# Patient Record
Sex: FEMALE | Race: WHITE | NOT HISPANIC OR LATINO | Employment: STUDENT | URBAN - METROPOLITAN AREA
[De-identification: names, ages, dates, MRNs, and addresses within clinical notes are randomized per-mention and may not be internally consistent; named-entity substitution may affect disease eponyms.]

---

## 2022-02-08 ENCOUNTER — LAB VISIT (OUTPATIENT)
Dept: LAB | Facility: HOSPITAL | Age: 22
End: 2022-02-08
Attending: STUDENT IN AN ORGANIZED HEALTH CARE EDUCATION/TRAINING PROGRAM
Payer: MEDICAID

## 2022-02-08 ENCOUNTER — OFFICE VISIT (OUTPATIENT)
Dept: OBSTETRICS AND GYNECOLOGY | Facility: CLINIC | Age: 22
End: 2022-02-08
Payer: MEDICAID

## 2022-02-08 VITALS
HEIGHT: 62 IN | SYSTOLIC BLOOD PRESSURE: 102 MMHG | BODY MASS INDEX: 21.17 KG/M2 | DIASTOLIC BLOOD PRESSURE: 64 MMHG | WEIGHT: 115.06 LBS | RESPIRATION RATE: 16 BRPM

## 2022-02-08 DIAGNOSIS — N91.0 DELAYED MENSES: ICD-10-CM

## 2022-02-08 DIAGNOSIS — Z01.419 ENCOUNTER FOR ROUTINE GYNECOLOGICAL EXAMINATION WITH PAPANICOLAOU SMEAR OF CERVIX: ICD-10-CM

## 2022-02-08 DIAGNOSIS — N91.0 DELAYED MENSES: Primary | ICD-10-CM

## 2022-02-08 LAB
ABO + RH BLD: NORMAL
B-HCG UR QL: POSITIVE
BASOPHILS # BLD AUTO: 0.02 K/UL (ref 0–0.2)
BASOPHILS NFR BLD: 0.3 % (ref 0–1.9)
BLD GP AB SCN CELLS X3 SERPL QL: NORMAL
CTP QC/QA: YES
DIFFERENTIAL METHOD: ABNORMAL
EOSINOPHIL # BLD AUTO: 0 K/UL (ref 0–0.5)
EOSINOPHIL NFR BLD: 0.3 % (ref 0–8)
ERYTHROCYTE [DISTWIDTH] IN BLOOD BY AUTOMATED COUNT: 12.3 % (ref 11.5–14.5)
HCT VFR BLD AUTO: 35.6 % (ref 37–48.5)
HGB BLD-MCNC: 12.3 G/DL (ref 12–16)
IMM GRANULOCYTES # BLD AUTO: 0.01 K/UL (ref 0–0.04)
IMM GRANULOCYTES NFR BLD AUTO: 0.2 % (ref 0–0.5)
LYMPHOCYTES # BLD AUTO: 1.5 K/UL (ref 1–4.8)
LYMPHOCYTES NFR BLD: 22.4 % (ref 18–48)
MCH RBC QN AUTO: 31.3 PG (ref 27–31)
MCHC RBC AUTO-ENTMCNC: 34.6 G/DL (ref 32–36)
MCV RBC AUTO: 91 FL (ref 82–98)
MONOCYTES # BLD AUTO: 0.5 K/UL (ref 0.3–1)
MONOCYTES NFR BLD: 7.4 % (ref 4–15)
NEUTROPHILS # BLD AUTO: 4.5 K/UL (ref 1.8–7.7)
NEUTROPHILS NFR BLD: 69.4 % (ref 38–73)
NRBC BLD-RTO: 0 /100 WBC
PLATELET # BLD AUTO: 269 K/UL (ref 150–450)
PMV BLD AUTO: 10.9 FL (ref 9.2–12.9)
RBC # BLD AUTO: 3.93 M/UL (ref 4–5.4)
WBC # BLD AUTO: 6.47 K/UL (ref 3.9–12.7)

## 2022-02-08 PROCEDURE — 86787 VARICELLA-ZOSTER ANTIBODY: CPT | Performed by: STUDENT IN AN ORGANIZED HEALTH CARE EDUCATION/TRAINING PROGRAM

## 2022-02-08 PROCEDURE — 76817 PR US, OB, TRANSVAG APPROACH: ICD-10-PCS | Mod: 26,S$PBB,, | Performed by: STUDENT IN AN ORGANIZED HEALTH CARE EDUCATION/TRAINING PROGRAM

## 2022-02-08 PROCEDURE — 99204 OFFICE O/P NEW MOD 45 MIN: CPT | Mod: S$PBB,TH,25, | Performed by: STUDENT IN AN ORGANIZED HEALTH CARE EDUCATION/TRAINING PROGRAM

## 2022-02-08 PROCEDURE — 87340 HEPATITIS B SURFACE AG IA: CPT | Performed by: STUDENT IN AN ORGANIZED HEALTH CARE EDUCATION/TRAINING PROGRAM

## 2022-02-08 PROCEDURE — 87624 HPV HI-RISK TYP POOLED RSLT: CPT | Performed by: STUDENT IN AN ORGANIZED HEALTH CARE EDUCATION/TRAINING PROGRAM

## 2022-02-08 PROCEDURE — 1159F PR MEDICATION LIST DOCUMENTED IN MEDICAL RECORD: ICD-10-PCS | Mod: CPTII,,, | Performed by: STUDENT IN AN ORGANIZED HEALTH CARE EDUCATION/TRAINING PROGRAM

## 2022-02-08 PROCEDURE — 81220 CFTR GENE COM VARIANTS: CPT | Performed by: STUDENT IN AN ORGANIZED HEALTH CARE EDUCATION/TRAINING PROGRAM

## 2022-02-08 PROCEDURE — 87591 N.GONORRHOEAE DNA AMP PROB: CPT | Mod: 59 | Performed by: STUDENT IN AN ORGANIZED HEALTH CARE EDUCATION/TRAINING PROGRAM

## 2022-02-08 PROCEDURE — 99999 PR PBB SHADOW E&M-EST. PATIENT-LVL III: CPT | Mod: PBBFAC,,, | Performed by: STUDENT IN AN ORGANIZED HEALTH CARE EDUCATION/TRAINING PROGRAM

## 2022-02-08 PROCEDURE — 76817 TRANSVAGINAL US OBSTETRIC: CPT | Mod: PBBFAC,PN | Performed by: STUDENT IN AN ORGANIZED HEALTH CARE EDUCATION/TRAINING PROGRAM

## 2022-02-08 PROCEDURE — 87086 URINE CULTURE/COLONY COUNT: CPT | Performed by: STUDENT IN AN ORGANIZED HEALTH CARE EDUCATION/TRAINING PROGRAM

## 2022-02-08 PROCEDURE — 3078F DIAST BP <80 MM HG: CPT | Mod: CPTII,,, | Performed by: STUDENT IN AN ORGANIZED HEALTH CARE EDUCATION/TRAINING PROGRAM

## 2022-02-08 PROCEDURE — 87389 HIV-1 AG W/HIV-1&-2 AB AG IA: CPT | Performed by: STUDENT IN AN ORGANIZED HEALTH CARE EDUCATION/TRAINING PROGRAM

## 2022-02-08 PROCEDURE — 36415 COLL VENOUS BLD VENIPUNCTURE: CPT | Mod: PN | Performed by: STUDENT IN AN ORGANIZED HEALTH CARE EDUCATION/TRAINING PROGRAM

## 2022-02-08 PROCEDURE — 86803 HEPATITIS C AB TEST: CPT | Performed by: STUDENT IN AN ORGANIZED HEALTH CARE EDUCATION/TRAINING PROGRAM

## 2022-02-08 PROCEDURE — 85025 COMPLETE CBC W/AUTO DIFF WBC: CPT | Performed by: STUDENT IN AN ORGANIZED HEALTH CARE EDUCATION/TRAINING PROGRAM

## 2022-02-08 PROCEDURE — 86762 RUBELLA ANTIBODY: CPT | Performed by: STUDENT IN AN ORGANIZED HEALTH CARE EDUCATION/TRAINING PROGRAM

## 2022-02-08 PROCEDURE — 99204 PR OFFICE/OUTPT VISIT, NEW, LEVL IV, 45-59 MIN: ICD-10-PCS | Mod: S$PBB,TH,25, | Performed by: STUDENT IN AN ORGANIZED HEALTH CARE EDUCATION/TRAINING PROGRAM

## 2022-02-08 PROCEDURE — 99213 OFFICE O/P EST LOW 20 MIN: CPT | Mod: PBBFAC,PN | Performed by: STUDENT IN AN ORGANIZED HEALTH CARE EDUCATION/TRAINING PROGRAM

## 2022-02-08 PROCEDURE — 3008F BODY MASS INDEX DOCD: CPT | Mod: CPTII,,, | Performed by: STUDENT IN AN ORGANIZED HEALTH CARE EDUCATION/TRAINING PROGRAM

## 2022-02-08 PROCEDURE — 76817 TRANSVAGINAL US OBSTETRIC: CPT | Mod: 26,S$PBB,, | Performed by: STUDENT IN AN ORGANIZED HEALTH CARE EDUCATION/TRAINING PROGRAM

## 2022-02-08 PROCEDURE — 86592 SYPHILIS TEST NON-TREP QUAL: CPT | Performed by: STUDENT IN AN ORGANIZED HEALTH CARE EDUCATION/TRAINING PROGRAM

## 2022-02-08 PROCEDURE — 81025 URINE PREGNANCY TEST: CPT | Mod: PBBFAC,PN | Performed by: STUDENT IN AN ORGANIZED HEALTH CARE EDUCATION/TRAINING PROGRAM

## 2022-02-08 PROCEDURE — 3008F PR BODY MASS INDEX (BMI) DOCUMENTED: ICD-10-PCS | Mod: CPTII,,, | Performed by: STUDENT IN AN ORGANIZED HEALTH CARE EDUCATION/TRAINING PROGRAM

## 2022-02-08 PROCEDURE — 3078F PR MOST RECENT DIASTOLIC BLOOD PRESSURE < 80 MM HG: ICD-10-PCS | Mod: CPTII,,, | Performed by: STUDENT IN AN ORGANIZED HEALTH CARE EDUCATION/TRAINING PROGRAM

## 2022-02-08 PROCEDURE — 87491 CHLMYD TRACH DNA AMP PROBE: CPT | Mod: 59 | Performed by: STUDENT IN AN ORGANIZED HEALTH CARE EDUCATION/TRAINING PROGRAM

## 2022-02-08 PROCEDURE — 99999 PR PBB SHADOW E&M-EST. PATIENT-LVL III: ICD-10-PCS | Mod: PBBFAC,,, | Performed by: STUDENT IN AN ORGANIZED HEALTH CARE EDUCATION/TRAINING PROGRAM

## 2022-02-08 PROCEDURE — 88175 CYTOPATH C/V AUTO FLUID REDO: CPT | Performed by: STUDENT IN AN ORGANIZED HEALTH CARE EDUCATION/TRAINING PROGRAM

## 2022-02-08 PROCEDURE — 3074F SYST BP LT 130 MM HG: CPT | Mod: CPTII,,, | Performed by: STUDENT IN AN ORGANIZED HEALTH CARE EDUCATION/TRAINING PROGRAM

## 2022-02-08 PROCEDURE — 3074F PR MOST RECENT SYSTOLIC BLOOD PRESSURE < 130 MM HG: ICD-10-PCS | Mod: CPTII,,, | Performed by: STUDENT IN AN ORGANIZED HEALTH CARE EDUCATION/TRAINING PROGRAM

## 2022-02-08 PROCEDURE — 86901 BLOOD TYPING SEROLOGIC RH(D): CPT | Performed by: STUDENT IN AN ORGANIZED HEALTH CARE EDUCATION/TRAINING PROGRAM

## 2022-02-08 PROCEDURE — 1159F MED LIST DOCD IN RCRD: CPT | Mod: CPTII,,, | Performed by: STUDENT IN AN ORGANIZED HEALTH CARE EDUCATION/TRAINING PROGRAM

## 2022-02-08 RX ORDER — ONDANSETRON 4 MG/1
4 TABLET, ORALLY DISINTEGRATING ORAL EVERY 6 HOURS PRN
Qty: 30 TABLET | Refills: 0 | Status: SHIPPED | OUTPATIENT
Start: 2022-02-08 | End: 2022-02-26 | Stop reason: SDUPTHER

## 2022-02-08 RX ORDER — DOXYLAMINE SUCCINATE AND PYRIDOXINE HYDROCHLORIDE, DELAYED RELEASE TABLETS 10 MG/10 MG 10; 10 MG/1; MG/1
2 TABLET, DELAYED RELEASE ORAL NIGHTLY
Qty: 60 TABLET | Refills: 1 | Status: ON HOLD | OUTPATIENT
Start: 2022-02-08 | End: 2022-09-16 | Stop reason: HOSPADM

## 2022-02-08 NOTE — PROGRESS NOTES
"CC: Absence of menses    Nany Travis is a 21 y.o. female No obstetric history on file. presents with complaint of absence of menstruation.  She denies nausea/vomIting/abdominal pain/bleeding.  UPT is positive.     LMP 12/6/21 9w1d 9/12/22    G1, surprise pregnancy     No immediate FH of genetic disorders or chromosomal abnormalities, no babies with CHD    Covid vaccinated      and goes to - [employer] in May, [employer]. Partner [employer]      History reviewed. No pertinent past medical history.  History reviewed. No pertinent surgical history.  Social History     Socioeconomic History    Marital status: Single   Tobacco Use    Smoking status: Never Smoker    Smokeless tobacco: Never Used   Substance and Sexual Activity    Alcohol use: Not Currently    Drug use: Never    Sexual activity: Yes     Partners: Male     Family History   Problem Relation Age of Onset    Breast cancer Neg Hx     Colon cancer Neg Hx     Diabetes Neg Hx     Ovarian cancer Neg Hx      OB History   No obstetric history on file.       /64   Resp 16   Ht 5' 2" (1.575 m)   Wt 52.2 kg (115 lb 1.3 oz)   LMP 12/06/2021 (Approximate)   BMI 21.05 kg/m²     ROS:  GENERAL: Denies weight gain or weight loss. Feeling well overall.   SKIN: Denies rash or lesions.   HEAD: Denies head injury or headache.   NODES: Denies enlarged lymph nodes.   CHEST: Denies chest pain or shortness of breath.   CARDIOVASCULAR: Denies palpitations or left sided chest pain.   ABDOMEN: No abdominal pain, constipation, diarrhea, nausea, vomiting or rectal bleeding.   URINARY: No frequency, dysuria, hematuria, or burning on urination.  REPRODUCTIVE: See HPI.   BREASTS: The patient performs breast self-examination and denies pain, lumps, or nipple discharge.   HEMATOLOGIC: No easy bruisability or excessive bleeding.   MUSCULOSKELETAL: Denies joint pain or swelling.   NEUROLOGIC: Denies syncope or weakness.   PSYCHIATRIC: Denies depression, " anxiety or mood swings.    PE:   APPEARANCE: Well nourished, well developed, in no acute distress.  AFFECT: WNL, alert and oriented x 3.  SKIN: No acne or hirsutism.  NECK: Neck symmetric without masses or thyromegaly.  NODES: No inguinal, cervical, axillary or femoral lymph node enlargement.  CHEST: Good respiratory effort.   ABDOMEN: Soft. No tenderness or masses. No hepatosplenomegaly. No hernias.  BREASTS: Symmetrical, no skin changes or visible lesions. No palpable masses, nipple discharge bilaterally.  PELVIC: Normal external female genitalia without lesions. Normal hair distribution. Adequate perineal body, normal urethral meatus. Vagina moist and well rugated without lesions or discharge. Cervix pink, without lesions, discharge or tenderness. No significant cystocele or rectocele. Bimanual exam shows uterus is 9 weeks, regular, mobile and nontender. Adnexa without masses or tenderness.  EXTREMITIES: No edema.      ULTRASOUND:   Ultrasound performed in the usual fashion, showing viable ibarra intrauterine pregnancy, crown-rump length 22mm  with flicker,   consistent with    9w0d     and EDC 9/12/22  No free fluid in cul-de-sac or adnexal pathology.    ASSESSMENT and PLAN:    ICD-10-CM ICD-9-CM    1. Delayed menses  N91.0 626.8 POCT urine pregnancy       Orders Placed This Encounter   Procedures    POCT urine pregnancy       FRANK 9/12/22      - Patient was counseled today on proper weight gain based on the [facility]'s recommendations based on her pre-pregnancy weight. Discussed foods to avoid in pregnancy (i.e. sushi, fish that are high in mercury, deli meat, and unpasteurized cheeses). Discussed prenatal vitamin options (i.e. stool softener, DHA). Discussed genetic screening, patient desires, discussed genetic carrier screening, patient desires   - 1T labs ordered  - NIPT ordered   - pap today   - US performed today in clinic       No follow-ups on file.

## 2022-02-09 LAB
BACTERIA UR CULT: NORMAL
HBV SURFACE AG SERPL QL IA: NEGATIVE
HCV AB SERPL QL IA: NEGATIVE
HIV 1+2 AB+HIV1 P24 AG SERPL QL IA: NEGATIVE
RPR SER QL: NORMAL
RUBV IGG SER-ACNC: 13.6 IU/ML
RUBV IGG SER-IMP: REACTIVE
VARICELLA INTERPRETATION: NEGATIVE
VARICELLA ZOSTER IGG: 0.55 ISR (ref 0–0.9)

## 2022-02-10 ENCOUNTER — PATIENT MESSAGE (OUTPATIENT)
Dept: OBSTETRICS AND GYNECOLOGY | Facility: CLINIC | Age: 22
End: 2022-02-10
Payer: MEDICAID

## 2022-02-10 LAB
C TRACH DNA SPEC QL NAA+PROBE: NOT DETECTED
N GONORRHOEA DNA SPEC QL NAA+PROBE: NOT DETECTED

## 2022-02-16 LAB
CLINICAL INFO: ABNORMAL
CYTO CVX: ABNORMAL
CYTOLOGIST CVX/VAG CYTO: ABNORMAL
CYTOLOGIST CVX/VAG CYTO: ABNORMAL
CYTOLOGY CMNT CVX/VAG CYTO-IMP: ABNORMAL
CYTOLOGY PAP THIN PREP EXPLANATION: ABNORMAL
DATE OF PREVIOUS PAP: ABNORMAL
DATE PREVIOUS BX: NO
GEN CATEG CVX/VAG CYTO-IMP: ABNORMAL
HPV I/H RISK 4 DNA CVX QL NAA+PROBE: NOT DETECTED
LMP START DATE: ABNORMAL
MICROORGANISM CVX/VAG CYTO: ABNORMAL
PATHOLOGIST CVX/VAG CYTO: ABNORMAL
SERVICE CMNT-IMP: ABNORMAL
SPECIMEN SOURCE CVX/VAG CYTO: ABNORMAL
STAT OF ADQ CVX/VAG CYTO-IMP: ABNORMAL

## 2022-02-22 LAB
CFTR MUT ANL BLD/T: NEGATIVE
CFTR MUT ANL BLD/T: NORMAL
CFTR MUT TESTED BLD/T: NORMAL
GENETICIST REVIEW: NORMAL
REF LAB TEST METHOD: NORMAL

## 2022-03-03 ENCOUNTER — PATIENT MESSAGE (OUTPATIENT)
Dept: OBSTETRICS AND GYNECOLOGY | Facility: CLINIC | Age: 22
End: 2022-03-03
Payer: MEDICAID

## 2022-03-03 RX ORDER — ONDANSETRON 8 MG/1
8 TABLET, ORALLY DISINTEGRATING ORAL EVERY 12 HOURS PRN
Qty: 20 TABLET | Refills: 1 | Status: SHIPPED | OUTPATIENT
Start: 2022-03-03 | End: 2022-04-28 | Stop reason: SDUPTHER

## 2022-03-08 ENCOUNTER — ROUTINE PRENATAL (OUTPATIENT)
Dept: OBSTETRICS AND GYNECOLOGY | Facility: CLINIC | Age: 22
End: 2022-03-08
Payer: MEDICAID

## 2022-03-08 VITALS
WEIGHT: 116.19 LBS | SYSTOLIC BLOOD PRESSURE: 110 MMHG | BODY MASS INDEX: 21.25 KG/M2 | DIASTOLIC BLOOD PRESSURE: 72 MMHG

## 2022-03-08 DIAGNOSIS — Z3A.13 13 WEEKS GESTATION OF PREGNANCY: Primary | ICD-10-CM

## 2022-03-08 LAB
BILIRUB SERPL-MCNC: NORMAL MG/DL
BLOOD URINE, POC: NORMAL
CLARITY, POC UA: CLEAR
COLOR, POC UA: YELLOW
GLUCOSE UR QL STRIP: NORMAL
KETONES UR QL STRIP: NORMAL
LEUKOCYTE ESTERASE URINE, POC: NORMAL
NITRITE, POC UA: NORMAL
PH, POC UA: 8
PROTEIN, POC: NORMAL
SPECIFIC GRAVITY, POC UA: NORMAL
UROBILINOGEN, POC UA: NORMAL

## 2022-03-08 PROCEDURE — 99213 PR OFFICE/OUTPT VISIT, EST, LEVL III, 20-29 MIN: ICD-10-PCS | Mod: S$PBB,TH,, | Performed by: STUDENT IN AN ORGANIZED HEALTH CARE EDUCATION/TRAINING PROGRAM

## 2022-03-08 PROCEDURE — 81002 URINALYSIS NONAUTO W/O SCOPE: CPT | Mod: PBBFAC,PN | Performed by: STUDENT IN AN ORGANIZED HEALTH CARE EDUCATION/TRAINING PROGRAM

## 2022-03-08 PROCEDURE — 99999 PR PBB SHADOW E&M-EST. PATIENT-LVL III: CPT | Mod: PBBFAC,,, | Performed by: STUDENT IN AN ORGANIZED HEALTH CARE EDUCATION/TRAINING PROGRAM

## 2022-03-08 PROCEDURE — 99999 PR PBB SHADOW E&M-EST. PATIENT-LVL III: ICD-10-PCS | Mod: PBBFAC,,, | Performed by: STUDENT IN AN ORGANIZED HEALTH CARE EDUCATION/TRAINING PROGRAM

## 2022-03-08 PROCEDURE — 99213 OFFICE O/P EST LOW 20 MIN: CPT | Mod: S$PBB,TH,, | Performed by: STUDENT IN AN ORGANIZED HEALTH CARE EDUCATION/TRAINING PROGRAM

## 2022-03-08 PROCEDURE — 99213 OFFICE O/P EST LOW 20 MIN: CPT | Mod: PBBFAC,PN | Performed by: STUDENT IN AN ORGANIZED HEALTH CARE EDUCATION/TRAINING PROGRAM

## 2022-03-08 NOTE — PROGRESS NOTES
Complaints today:Still some nausea and food aversions. Zofran working, constipated. No Bleeding or pains    /72   Wt 52.7 kg (116 lb 2.9 oz)   LMP 2021 (Approximate)   BMI 21.25 kg/m²     21 y.o., at 13w1d by Estimated Date of Delivery: 22  There is no problem list on file for this patient.    OB History    Para Term  AB Living   1             SAB IAB Ectopic Multiple Live Births                  # Outcome Date GA Lbr Morris/2nd Weight Sex Delivery Anes PTL Lv   1 Current                Dating reviewed    Allergies and problem list reviewed and updated    Medical and surgical history reviewed    Prenatal labs reviewed and updated    Physical Exam:  ABD: soft, gravid, nontender,       Assessment:  Nany was seen today for routine prenatal visit.    Diagnoses and all orders for this visit:    13 weeks gestation of pregnancy  -     POCT URINE DIPSTICK WITHOUT MICROSCOPE          Plan:   - reviewed 1 T labs  - NIPT neg, F  - order anatomy next visit    follow up 4Weeks, bleeding/pain precautions, labor precautions given    Rosie Hinojosa M.D.  Obstetrics and Gynecology

## 2022-03-25 ENCOUNTER — TELEPHONE (OUTPATIENT)
Dept: OBSTETRICS AND GYNECOLOGY | Facility: CLINIC | Age: 22
End: 2022-03-25
Payer: MEDICAID

## 2022-03-25 NOTE — TELEPHONE ENCOUNTER
----- Message from Natalie Cunningham sent at 3/25/2022 11:53 AM CDT -----  Needs Medical Advice    Who Called: PT     Symptoms (please be specific):  heart rate high chest pain    How long has patient had these symptoms: yesterday     Pharmacy name and phone #:      Best Call Back Number:[phone]

## 2022-03-25 NOTE — TELEPHONE ENCOUNTER
Pt stated that she has been having to take deep breaths at times and felt shaky. Stated that she has had an increased heart rate when she was at her chiropractor. Advised pt to do some relaxation techniques to see if it would help and control her breathing, if the symptoms did not go away or if chest pains started to go to the ER for evaluation. Pt verbalized understanding.

## 2022-04-05 ENCOUNTER — ROUTINE PRENATAL (OUTPATIENT)
Dept: OBSTETRICS AND GYNECOLOGY | Facility: CLINIC | Age: 22
End: 2022-04-05
Payer: MEDICAID

## 2022-04-05 VITALS
BODY MASS INDEX: 21.94 KG/M2 | SYSTOLIC BLOOD PRESSURE: 108 MMHG | DIASTOLIC BLOOD PRESSURE: 60 MMHG | WEIGHT: 119.94 LBS

## 2022-04-05 DIAGNOSIS — Z3A.17 17 WEEKS GESTATION OF PREGNANCY: Primary | ICD-10-CM

## 2022-04-05 LAB
BILIRUB SERPL-MCNC: NORMAL MG/DL
BLOOD URINE, POC: NORMAL
CLARITY, POC UA: CLEAR
COLOR, POC UA: YELLOW
GLUCOSE UR QL STRIP: NORMAL
KETONES UR QL STRIP: NORMAL
LEUKOCYTE ESTERASE URINE, POC: NORMAL
NITRITE, POC UA: NORMAL
PH, POC UA: 7
PROTEIN, POC: NORMAL
SPECIFIC GRAVITY, POC UA: NORMAL
UROBILINOGEN, POC UA: NORMAL

## 2022-04-05 PROCEDURE — 99999 PR PBB SHADOW E&M-EST. PATIENT-LVL III: CPT | Mod: PBBFAC,,, | Performed by: STUDENT IN AN ORGANIZED HEALTH CARE EDUCATION/TRAINING PROGRAM

## 2022-04-05 PROCEDURE — 99999 PR PBB SHADOW E&M-EST. PATIENT-LVL III: ICD-10-PCS | Mod: PBBFAC,,, | Performed by: STUDENT IN AN ORGANIZED HEALTH CARE EDUCATION/TRAINING PROGRAM

## 2022-04-05 PROCEDURE — 99213 OFFICE O/P EST LOW 20 MIN: CPT | Mod: PBBFAC,PN | Performed by: STUDENT IN AN ORGANIZED HEALTH CARE EDUCATION/TRAINING PROGRAM

## 2022-04-05 PROCEDURE — 81002 URINALYSIS NONAUTO W/O SCOPE: CPT | Mod: PBBFAC,PN | Performed by: STUDENT IN AN ORGANIZED HEALTH CARE EDUCATION/TRAINING PROGRAM

## 2022-04-05 PROCEDURE — 99213 PR OFFICE/OUTPT VISIT, EST, LEVL III, 20-29 MIN: ICD-10-PCS | Mod: S$PBB,TH,, | Performed by: STUDENT IN AN ORGANIZED HEALTH CARE EDUCATION/TRAINING PROGRAM

## 2022-04-05 PROCEDURE — 99213 OFFICE O/P EST LOW 20 MIN: CPT | Mod: S$PBB,TH,, | Performed by: STUDENT IN AN ORGANIZED HEALTH CARE EDUCATION/TRAINING PROGRAM

## 2022-04-05 NOTE — PROGRESS NOTES
Complaints today:None, No Bleeding or pains    /60   Wt 54.4 kg (119 lb 14.9 oz)   LMP 2021 (Approximate)   BMI 21.94 kg/m²     21 y.o., at 17w1d by Estimated Date of Delivery: 22  There is no problem list on file for this patient.    OB History    Para Term  AB Living   1             SAB IAB Ectopic Multiple Live Births                  # Outcome Date GA Lbr Morris/2nd Weight Sex Delivery Anes PTL Lv   1 Current                Dating reviewed    Allergies and problem list reviewed and updated    Medical and surgical history reviewed    Prenatal labs reviewed and updated    Physical Exam:  ABD: soft, gravid, nontender,       Assessment:  Nany was seen today for routine prenatal visit.    Diagnoses and all orders for this visit:    17 weeks gestation of pregnancy  -     POCT URINE DIPSTICK WITHOUT MICROSCOPE  -     US OB 14+ Wks, TransAbd, Single Gestation; Future          Plan:   - anatomy scan ordered   follow up 4Weeks, bleeding/pain precautions , kick counts, labor precautions given    Rosie Hinojosa M.D.  Obstetrics and Gynecology

## 2022-04-18 ENCOUNTER — ROUTINE PRENATAL (OUTPATIENT)
Dept: OBSTETRICS AND GYNECOLOGY | Facility: CLINIC | Age: 22
End: 2022-04-18
Payer: MEDICAID

## 2022-04-18 ENCOUNTER — HOSPITAL ENCOUNTER (OUTPATIENT)
Dept: RADIOLOGY | Facility: HOSPITAL | Age: 22
Discharge: HOME | End: 2022-04-18
Attending: STUDENT IN AN ORGANIZED HEALTH CARE EDUCATION/TRAINING PROGRAM
Payer: MEDICAID

## 2022-04-18 VITALS
SYSTOLIC BLOOD PRESSURE: 104 MMHG | WEIGHT: 124.31 LBS | BODY MASS INDEX: 22.74 KG/M2 | DIASTOLIC BLOOD PRESSURE: 62 MMHG

## 2022-04-18 DIAGNOSIS — Z3A.17 17 WEEKS GESTATION OF PREGNANCY: ICD-10-CM

## 2022-04-18 DIAGNOSIS — Z3A.19 19 WEEKS GESTATION OF PREGNANCY: Primary | ICD-10-CM

## 2022-04-18 PROCEDURE — 99212 OFFICE O/P EST SF 10 MIN: CPT | Mod: PBBFAC,25,PN | Performed by: STUDENT IN AN ORGANIZED HEALTH CARE EDUCATION/TRAINING PROGRAM

## 2022-04-18 PROCEDURE — 99999 PR PBB SHADOW E&M-EST. PATIENT-LVL II: CPT | Mod: PBBFAC,,, | Performed by: STUDENT IN AN ORGANIZED HEALTH CARE EDUCATION/TRAINING PROGRAM

## 2022-04-18 PROCEDURE — 99213 PR OFFICE/OUTPT VISIT, EST, LEVL III, 20-29 MIN: ICD-10-PCS | Mod: S$PBB,TH,, | Performed by: STUDENT IN AN ORGANIZED HEALTH CARE EDUCATION/TRAINING PROGRAM

## 2022-04-18 PROCEDURE — 76805 OB US >/= 14 WKS SNGL FETUS: CPT | Mod: TC,PN

## 2022-04-18 PROCEDURE — 76805 US OB 14+ WEEKS, TRANSABDOM, SINGLE GESTATION: ICD-10-PCS | Mod: 26,,, | Performed by: RADIOLOGY

## 2022-04-18 PROCEDURE — 99999 PR PBB SHADOW E&M-EST. PATIENT-LVL II: ICD-10-PCS | Mod: PBBFAC,,, | Performed by: STUDENT IN AN ORGANIZED HEALTH CARE EDUCATION/TRAINING PROGRAM

## 2022-04-18 PROCEDURE — 99213 OFFICE O/P EST LOW 20 MIN: CPT | Mod: S$PBB,TH,, | Performed by: STUDENT IN AN ORGANIZED HEALTH CARE EDUCATION/TRAINING PROGRAM

## 2022-04-18 PROCEDURE — 81002 URINALYSIS NONAUTO W/O SCOPE: CPT | Mod: PBBFAC,PN | Performed by: STUDENT IN AN ORGANIZED HEALTH CARE EDUCATION/TRAINING PROGRAM

## 2022-04-18 PROCEDURE — 76805 OB US >/= 14 WKS SNGL FETUS: CPT | Mod: 26,,, | Performed by: RADIOLOGY

## 2022-04-18 NOTE — PROGRESS NOTES
Complaints today:None, Good fetal movements reported,No Bleeding or pains    /62   Wt 56.4 kg (124 lb 5.4 oz)   LMP 2021 (Approximate)   BMI 22.74 kg/m²     21 y.o., at 19w0d by Estimated Date of Delivery: 22  There is no problem list on file for this patient.    OB History    Para Term  AB Living   1             SAB IAB Ectopic Multiple Live Births                  # Outcome Date GA Lbr Morris/2nd Weight Sex Delivery Anes PTL Lv   1 Current                Dating reviewed    Allergies and problem list reviewed and updated    Medical and surgical history reviewed    Prenatal labs reviewed and updated    Physical Exam:  ABD: soft, gravid, nontender,       Assessment:  Nany was seen today for routine prenatal visit.    Diagnoses and all orders for this visit:    19 weeks gestation of pregnancy  -     POCT URINE DIPSTICK WITHOUT MICROSCOPE          Plan:   Anatomy done today  - discussed glucose screen    follow up 4Weeks, bleeding/pain precautions , kick counts, labor precautions given    Rosie Hinojosa M.D.  Obstetrics and Gynecology

## 2022-04-29 ENCOUNTER — PATIENT MESSAGE (OUTPATIENT)
Dept: OBSTETRICS AND GYNECOLOGY | Facility: CLINIC | Age: 22
End: 2022-04-29
Payer: MEDICAID

## 2022-05-16 ENCOUNTER — ROUTINE PRENATAL (OUTPATIENT)
Dept: OBSTETRICS AND GYNECOLOGY | Facility: CLINIC | Age: 22
End: 2022-05-16
Payer: MEDICAID

## 2022-05-16 VITALS — SYSTOLIC BLOOD PRESSURE: 112 MMHG | WEIGHT: 124.13 LBS | DIASTOLIC BLOOD PRESSURE: 64 MMHG | BODY MASS INDEX: 22.7 KG/M2

## 2022-05-16 DIAGNOSIS — Z3A.23 23 WEEKS GESTATION OF PREGNANCY: Primary | ICD-10-CM

## 2022-05-16 LAB
BILIRUB SERPL-MCNC: NORMAL MG/DL
BLOOD URINE, POC: NORMAL
CLARITY, POC UA: CLEAR
COLOR, POC UA: YELLOW
GLUCOSE UR QL STRIP: NORMAL
KETONES UR QL STRIP: NORMAL
LEUKOCYTE ESTERASE URINE, POC: NORMAL
NITRITE, POC UA: NORMAL
PH, POC UA: 8
PROTEIN, POC: NORMAL
SPECIFIC GRAVITY, POC UA: 1
UROBILINOGEN, POC UA: NORMAL

## 2022-05-16 PROCEDURE — 99999 PR PBB SHADOW E&M-EST. PATIENT-LVL III: ICD-10-PCS | Mod: PBBFAC,,, | Performed by: STUDENT IN AN ORGANIZED HEALTH CARE EDUCATION/TRAINING PROGRAM

## 2022-05-16 PROCEDURE — 81002 URINALYSIS NONAUTO W/O SCOPE: CPT | Mod: PBBFAC,PN | Performed by: STUDENT IN AN ORGANIZED HEALTH CARE EDUCATION/TRAINING PROGRAM

## 2022-05-16 PROCEDURE — 99213 OFFICE O/P EST LOW 20 MIN: CPT | Mod: S$PBB,TH,, | Performed by: STUDENT IN AN ORGANIZED HEALTH CARE EDUCATION/TRAINING PROGRAM

## 2022-05-16 PROCEDURE — 99213 OFFICE O/P EST LOW 20 MIN: CPT | Mod: PBBFAC,PN | Performed by: STUDENT IN AN ORGANIZED HEALTH CARE EDUCATION/TRAINING PROGRAM

## 2022-05-16 PROCEDURE — 87086 URINE CULTURE/COLONY COUNT: CPT | Performed by: STUDENT IN AN ORGANIZED HEALTH CARE EDUCATION/TRAINING PROGRAM

## 2022-05-16 PROCEDURE — 99213 PR OFFICE/OUTPT VISIT, EST, LEVL III, 20-29 MIN: ICD-10-PCS | Mod: S$PBB,TH,, | Performed by: STUDENT IN AN ORGANIZED HEALTH CARE EDUCATION/TRAINING PROGRAM

## 2022-05-16 PROCEDURE — 99999 PR PBB SHADOW E&M-EST. PATIENT-LVL III: CPT | Mod: PBBFAC,,, | Performed by: STUDENT IN AN ORGANIZED HEALTH CARE EDUCATION/TRAINING PROGRAM

## 2022-05-16 NOTE — PROGRESS NOTES
Complaints today:None, Good fetal movements reported,No Bleeding or pains    /64   Wt 56.3 kg (124 lb 1.9 oz)   LMP 2021 (Approximate)   BMI 22.70 kg/m²     21 y.o., at 23w0d by Estimated Date of Delivery: 22  There is no problem list on file for this patient.    OB History    Para Term  AB Living   1             SAB IAB Ectopic Multiple Live Births                  # Outcome Date GA Lbr Morris/2nd Weight Sex Delivery Anes PTL Lv   1 Current                Dating reviewed    Allergies and problem list reviewed and updated    Medical and surgical history reviewed    Prenatal labs reviewed and updated    Physical Exam:  ABD: soft, gravid, nontender, .    Assessment:  Nany was seen today for routine prenatal visit.    Diagnoses and all orders for this visit:    23 weeks gestation of pregnancy  -     POCT URINE DIPSTICK WITHOUT MICROSCOPE  -     Urine culture  -     US OB 14+ Wks, TransAbd, Single Gestation; Future  -     OB Glucose Screen; Future  -     CBC Auto Differential; Future          Plan:   -repeat anatomy ordered  - glucose/CBC ordered  - got hit in stomach with [employer] shopping buggy. ED note reviewed, no rhogam indicated.   - repeat Urine culture, had UTI early pregnancy    follow up 4Weeks, bleeding/pain precautions , kick counts, labor precautions given

## 2022-05-17 LAB — BACTERIA UR CULT: NORMAL

## 2022-05-23 ENCOUNTER — HOSPITAL ENCOUNTER (OUTPATIENT)
Dept: RADIOLOGY | Facility: HOSPITAL | Age: 22
Discharge: HOME | End: 2022-05-23
Attending: STUDENT IN AN ORGANIZED HEALTH CARE EDUCATION/TRAINING PROGRAM
Payer: MEDICAID

## 2022-05-23 ENCOUNTER — TELEPHONE (OUTPATIENT)
Dept: OBSTETRICS AND GYNECOLOGY | Facility: CLINIC | Age: 22
End: 2022-05-23

## 2022-05-23 ENCOUNTER — ROUTINE PRENATAL (OUTPATIENT)
Dept: OBSTETRICS AND GYNECOLOGY | Facility: CLINIC | Age: 22
End: 2022-05-23
Payer: MEDICAID

## 2022-05-23 VITALS
DIASTOLIC BLOOD PRESSURE: 70 MMHG | SYSTOLIC BLOOD PRESSURE: 116 MMHG | BODY MASS INDEX: 23.27 KG/M2 | WEIGHT: 127.19 LBS

## 2022-05-23 DIAGNOSIS — Z3A.24 24 WEEKS GESTATION OF PREGNANCY: Primary | ICD-10-CM

## 2022-05-23 DIAGNOSIS — Z3A.23 23 WEEKS GESTATION OF PREGNANCY: ICD-10-CM

## 2022-05-23 LAB
BILIRUB SERPL-MCNC: NEGATIVE MG/DL
BLOOD URINE, POC: NEGATIVE
CLARITY, POC UA: CLEAR
COLOR, POC UA: YELLOW
GLUCOSE UR QL STRIP: NORMAL
KETONES UR QL STRIP: NEGATIVE
LEUKOCYTE ESTERASE URINE, POC: NORMAL
NITRITE, POC UA: NEGATIVE
PH, POC UA: 7
PROTEIN, POC: NORMAL
SPECIFIC GRAVITY, POC UA: 1.01
UROBILINOGEN, POC UA: NORMAL

## 2022-05-23 PROCEDURE — 81002 URINALYSIS NONAUTO W/O SCOPE: CPT | Mod: PBBFAC,PN | Performed by: STUDENT IN AN ORGANIZED HEALTH CARE EDUCATION/TRAINING PROGRAM

## 2022-05-23 PROCEDURE — 99213 OFFICE O/P EST LOW 20 MIN: CPT | Mod: S$PBB,TH,, | Performed by: STUDENT IN AN ORGANIZED HEALTH CARE EDUCATION/TRAINING PROGRAM

## 2022-05-23 PROCEDURE — 99213 PR OFFICE/OUTPT VISIT, EST, LEVL III, 20-29 MIN: ICD-10-PCS | Mod: S$PBB,TH,, | Performed by: STUDENT IN AN ORGANIZED HEALTH CARE EDUCATION/TRAINING PROGRAM

## 2022-05-23 PROCEDURE — 99999 PR PBB SHADOW E&M-EST. PATIENT-LVL III: ICD-10-PCS | Mod: PBBFAC,,, | Performed by: STUDENT IN AN ORGANIZED HEALTH CARE EDUCATION/TRAINING PROGRAM

## 2022-05-23 PROCEDURE — 99999 PR PBB SHADOW E&M-EST. PATIENT-LVL III: CPT | Mod: PBBFAC,,, | Performed by: STUDENT IN AN ORGANIZED HEALTH CARE EDUCATION/TRAINING PROGRAM

## 2022-05-23 PROCEDURE — 99213 OFFICE O/P EST LOW 20 MIN: CPT | Mod: PBBFAC,PN | Performed by: STUDENT IN AN ORGANIZED HEALTH CARE EDUCATION/TRAINING PROGRAM

## 2022-05-23 PROCEDURE — 76815 OB US LIMITED FETUS(S): CPT | Mod: TC,PN

## 2022-05-23 PROCEDURE — 76815 US OB LIMITED 1 OR MORE GESTATIONS: ICD-10-PCS | Mod: 26,,, | Performed by: RADIOLOGY

## 2022-05-23 PROCEDURE — 76815 OB US LIMITED FETUS(S): CPT | Mod: 26,,, | Performed by: RADIOLOGY

## 2022-05-23 NOTE — TELEPHONE ENCOUNTER
Pt scheduled w/ [facility] thru 30 weeks needs Q2 starting July 18, pt likes mondays at 10, wouldn't allow me to override since pt has medicaid.

## 2022-05-23 NOTE — PROGRESS NOTES
Complaints today:Non, Good fetal movements reported,No Bleeding or pains    /70   Wt 57.7 kg (127 lb 3.3 oz)   LMP 2021 (Approximate)   BMI 23.27 kg/m²     21 y.o., at 24w0d by Estimated Date of Delivery: 22  There is no problem list on file for this patient.    OB History    Para Term  AB Living   1             SAB IAB Ectopic Multiple Live Births                  # Outcome Date GA Lbr Morris/2nd Weight Sex Delivery Anes PTL Lv   1 Current                Dating reviewed    Allergies and problem list reviewed and updated    Medical and surgical history reviewed    Prenatal labs reviewed and updated    Physical Exam:  ABD: soft, gravid, nontender,       Assessment:  Nany was seen today for routine prenatal visit.    Diagnoses and all orders for this visit:    24 weeks gestation of pregnancy  -     POCT URINE DIPSTICK WITHOUT MICROSCOPE          Plan:   - f/u US normal  - pending CBC/glucose   follow up 4Weeks, bleeding/pain precautions  kick counts, labor precautions given    Rosie Hinojosa M.D.  Obstetrics and Gynecology

## 2022-05-23 NOTE — TELEPHONE ENCOUNTER
----- Message from Rosie Hinojosa MD sent at 5/23/2022  9:49 AM CDT -----  Please have 28 and 30 week visit with me and then schedule her remainder of visits with Dr. Fletcher. Can loop in Sandra. Q 2 till 36 then weekly

## 2022-05-30 ENCOUNTER — PATIENT MESSAGE (OUTPATIENT)
Dept: OBSTETRICS AND GYNECOLOGY | Facility: CLINIC | Age: 22
End: 2022-05-30
Payer: MEDICAID

## 2022-05-30 ENCOUNTER — PATIENT MESSAGE (OUTPATIENT)
Dept: ADMINISTRATIVE | Facility: OTHER | Age: 22
End: 2022-05-30
Payer: MEDICAID

## 2022-06-20 ENCOUNTER — ROUTINE PRENATAL (OUTPATIENT)
Dept: OBSTETRICS AND GYNECOLOGY | Facility: CLINIC | Age: 22
End: 2022-06-20
Payer: MEDICAID

## 2022-06-20 VITALS
DIASTOLIC BLOOD PRESSURE: 62 MMHG | SYSTOLIC BLOOD PRESSURE: 114 MMHG | BODY MASS INDEX: 24.03 KG/M2 | WEIGHT: 131.38 LBS

## 2022-06-20 DIAGNOSIS — Z3A.28 28 WEEKS GESTATION OF PREGNANCY: Primary | ICD-10-CM

## 2022-06-20 DIAGNOSIS — Z23 ENCOUNTER FOR ADMINISTRATION OF VACCINE: ICD-10-CM

## 2022-06-20 LAB
BILIRUB SERPL-MCNC: NEGATIVE MG/DL
BLOOD URINE, POC: NEGATIVE
CLARITY, POC UA: CLEAR
COLOR, POC UA: YELLOW
GLUCOSE UR QL STRIP: NORMAL
KETONES UR QL STRIP: NORMAL
LEUKOCYTE ESTERASE URINE, POC: NORMAL
NITRITE, POC UA: NEGATIVE
PH, POC UA: 8
PROTEIN, POC: NORMAL
SPECIFIC GRAVITY, POC UA: NORMAL
UROBILINOGEN, POC UA: NORMAL

## 2022-06-20 PROCEDURE — 99999 PR PBB SHADOW E&M-EST. PATIENT-LVL III: CPT | Mod: PBBFAC,,, | Performed by: STUDENT IN AN ORGANIZED HEALTH CARE EDUCATION/TRAINING PROGRAM

## 2022-06-20 PROCEDURE — 99213 OFFICE O/P EST LOW 20 MIN: CPT | Mod: PBBFAC,PN | Performed by: STUDENT IN AN ORGANIZED HEALTH CARE EDUCATION/TRAINING PROGRAM

## 2022-06-20 PROCEDURE — 99999 PR PBB SHADOW E&M-EST. PATIENT-LVL III: ICD-10-PCS | Mod: PBBFAC,,, | Performed by: STUDENT IN AN ORGANIZED HEALTH CARE EDUCATION/TRAINING PROGRAM

## 2022-06-20 PROCEDURE — 90715 TDAP VACCINE 7 YRS/> IM: CPT | Mod: PBBFAC,PN

## 2022-06-20 PROCEDURE — 99213 PR OFFICE/OUTPT VISIT, EST, LEVL III, 20-29 MIN: ICD-10-PCS | Mod: S$PBB,TH,, | Performed by: STUDENT IN AN ORGANIZED HEALTH CARE EDUCATION/TRAINING PROGRAM

## 2022-06-20 PROCEDURE — 99213 OFFICE O/P EST LOW 20 MIN: CPT | Mod: S$PBB,TH,, | Performed by: STUDENT IN AN ORGANIZED HEALTH CARE EDUCATION/TRAINING PROGRAM

## 2022-06-20 PROCEDURE — 81002 URINALYSIS NONAUTO W/O SCOPE: CPT | Mod: PBBFAC,PN | Performed by: STUDENT IN AN ORGANIZED HEALTH CARE EDUCATION/TRAINING PROGRAM

## 2022-06-20 NOTE — PROGRESS NOTES
Complaints today:None, Good fetal movements reported,No Bleeding or pains    /62   Wt 59.6 kg (131 lb 6.3 oz)   LMP 2021 (Approximate)   BMI 24.03 kg/m²     21 y.o., at 28w0d by Estimated Date of Delivery: 22  There is no problem list on file for this patient.    OB History    Para Term  AB Living   1             SAB IAB Ectopic Multiple Live Births                  # Outcome Date GA Lbr Morris/2nd Weight Sex Delivery Anes PTL Lv   1 Current                Dating reviewed    Allergies and problem list reviewed and updated    Medical and surgical history reviewed    Prenatal labs reviewed and updated    Physical Exam:  ABD: soft, gravid, nontender, Size<dates      Assessment:  Nany was seen today for routine prenatal visit and medication refill.    Diagnoses and all orders for this visit:    28 weeks gestation of pregnancy  -     POCT URINE DIPSTICK WITHOUT MICROSCOPE          Plan:   - TDAP today  - growth US ordered for size<dates  - WIC info, breast pump info, and peds info given    follow up 2Weeks, bleeding/pain precautions , kick counts, labor precautions given    Rosie Hionjosa M.D.  Obstetrics and Gynecology

## 2022-06-21 ENCOUNTER — HOSPITAL ENCOUNTER (OUTPATIENT)
Dept: RADIOLOGY | Facility: HOSPITAL | Age: 22
Discharge: HOME | End: 2022-06-21
Attending: STUDENT IN AN ORGANIZED HEALTH CARE EDUCATION/TRAINING PROGRAM
Payer: MEDICAID

## 2022-06-21 DIAGNOSIS — Z3A.28 28 WEEKS GESTATION OF PREGNANCY: ICD-10-CM

## 2022-06-21 PROCEDURE — 76815 US OB LIMITED 1 OR MORE GESTATIONS: ICD-10-PCS | Mod: 26,,, | Performed by: RADIOLOGY

## 2022-06-21 PROCEDURE — 76815 OB US LIMITED FETUS(S): CPT | Mod: TC,PN

## 2022-06-21 PROCEDURE — 76815 OB US LIMITED FETUS(S): CPT | Mod: 26,,, | Performed by: RADIOLOGY

## 2022-06-29 ENCOUNTER — PATIENT MESSAGE (OUTPATIENT)
Dept: OBSTETRICS AND GYNECOLOGY | Facility: CLINIC | Age: 22
End: 2022-06-29
Payer: MEDICAID

## 2022-07-05 ENCOUNTER — ROUTINE PRENATAL (OUTPATIENT)
Dept: OBSTETRICS AND GYNECOLOGY | Facility: CLINIC | Age: 22
End: 2022-07-05
Payer: MEDICAID

## 2022-07-05 VITALS — SYSTOLIC BLOOD PRESSURE: 108 MMHG | BODY MASS INDEX: 24.6 KG/M2 | DIASTOLIC BLOOD PRESSURE: 62 MMHG | WEIGHT: 134.5 LBS

## 2022-07-05 DIAGNOSIS — Z3A.30 30 WEEKS GESTATION OF PREGNANCY: Primary | ICD-10-CM

## 2022-07-05 LAB
BILIRUB SERPL-MCNC: NEGATIVE MG/DL
BLOOD URINE, POC: NEGATIVE
CLARITY, POC UA: CLEAR
COLOR, POC UA: YELLOW
GLUCOSE UR QL STRIP: 50
KETONES UR QL STRIP: NEGATIVE
LEUKOCYTE ESTERASE URINE, POC: NORMAL
NITRITE, POC UA: NEGATIVE
PH, POC UA: 6
PROTEIN, POC: NEGATIVE
SPECIFIC GRAVITY, POC UA: NORMAL
UROBILINOGEN, POC UA: NORMAL

## 2022-07-05 PROCEDURE — 99999 PR PBB SHADOW E&M-EST. PATIENT-LVL II: CPT | Mod: PBBFAC,,, | Performed by: STUDENT IN AN ORGANIZED HEALTH CARE EDUCATION/TRAINING PROGRAM

## 2022-07-05 PROCEDURE — 81002 URINALYSIS NONAUTO W/O SCOPE: CPT | Mod: PBBFAC,PN | Performed by: STUDENT IN AN ORGANIZED HEALTH CARE EDUCATION/TRAINING PROGRAM

## 2022-07-05 PROCEDURE — 99213 OFFICE O/P EST LOW 20 MIN: CPT | Mod: S$PBB,TH,, | Performed by: STUDENT IN AN ORGANIZED HEALTH CARE EDUCATION/TRAINING PROGRAM

## 2022-07-05 PROCEDURE — 99212 OFFICE O/P EST SF 10 MIN: CPT | Mod: PBBFAC,PN | Performed by: STUDENT IN AN ORGANIZED HEALTH CARE EDUCATION/TRAINING PROGRAM

## 2022-07-05 PROCEDURE — 99213 PR OFFICE/OUTPT VISIT, EST, LEVL III, 20-29 MIN: ICD-10-PCS | Mod: S$PBB,TH,, | Performed by: STUDENT IN AN ORGANIZED HEALTH CARE EDUCATION/TRAINING PROGRAM

## 2022-07-05 PROCEDURE — 99999 PR PBB SHADOW E&M-EST. PATIENT-LVL II: ICD-10-PCS | Mod: PBBFAC,,, | Performed by: STUDENT IN AN ORGANIZED HEALTH CARE EDUCATION/TRAINING PROGRAM

## 2022-07-05 NOTE — PROGRESS NOTES
Complaints today:None, Good fetal movements reported,No Bleeding or pains    Occasional Low back pain  /62   Wt 61 kg (134 lb 7.7 oz)   LMP 2021 (Approximate)   BMI 24.60 kg/m²     21 y.o., at 30w1d by Estimated Date of Delivery: 22  There is no problem list on file for this patient.    OB History    Para Term  AB Living   1             SAB IAB Ectopic Multiple Live Births                  # Outcome Date GA Lbr Morris/2nd Weight Sex Delivery Anes PTL Lv   1 Current                Dating reviewed    Allergies and problem list reviewed and updated    Medical and surgical history reviewed    Prenatal labs reviewed and updated    Physical Exam:  ABD: soft, gravid, nontender,       Assessment:  Nany was seen today for routine prenatal visit and (r)lower back pain.    Diagnoses and all orders for this visit:    30 weeks gestation of pregnancy  -     POCT URINE DIPSTICK WITHOUT MICROSCOPE          Plan:   - s/p TDAP  - transferring to Dr. Fletcher   follow up 2Weeks, bleeding/pain precautions , kick counts, labor precautions given    Rosie Hinojosa M.D.  Obstetrics and Gynecology

## 2022-07-18 ENCOUNTER — ROUTINE PRENATAL (OUTPATIENT)
Dept: OBSTETRICS AND GYNECOLOGY | Facility: CLINIC | Age: 22
End: 2022-07-18
Payer: MEDICAID

## 2022-07-18 ENCOUNTER — PATIENT MESSAGE (OUTPATIENT)
Dept: ADMINISTRATIVE | Facility: OTHER | Age: 22
End: 2022-07-18
Payer: MEDICAID

## 2022-07-18 VITALS
DIASTOLIC BLOOD PRESSURE: 62 MMHG | SYSTOLIC BLOOD PRESSURE: 102 MMHG | BODY MASS INDEX: 25.08 KG/M2 | WEIGHT: 137.13 LBS

## 2022-07-18 DIAGNOSIS — Z34.83 ENCOUNTER FOR SUPERVISION OF OTHER NORMAL PREGNANCY, THIRD TRIMESTER: Primary | ICD-10-CM

## 2022-07-18 DIAGNOSIS — Z3A.32 32 WEEKS GESTATION OF PREGNANCY: ICD-10-CM

## 2022-07-18 LAB
BILIRUB SERPL-MCNC: NORMAL MG/DL
BLOOD URINE, POC: NORMAL
CLARITY, POC UA: NORMAL
COLOR, POC UA: YELLOW
GLUCOSE UR QL STRIP: 100
KETONES UR QL STRIP: NORMAL
LEUKOCYTE ESTERASE URINE, POC: NORMAL
NITRITE, POC UA: NORMAL
PH, POC UA: 8
PROTEIN, POC: NORMAL
SPECIFIC GRAVITY, POC UA: 1.01
UROBILINOGEN, POC UA: NORMAL

## 2022-07-18 PROCEDURE — 99212 OFFICE O/P EST SF 10 MIN: CPT | Mod: PBBFAC,TH,PN | Performed by: OBSTETRICS & GYNECOLOGY

## 2022-07-18 PROCEDURE — 81002 URINALYSIS NONAUTO W/O SCOPE: CPT | Mod: PBBFAC,PN | Performed by: OBSTETRICS & GYNECOLOGY

## 2022-07-18 PROCEDURE — 99213 PR OFFICE/OUTPT VISIT, EST, LEVL III, 20-29 MIN: ICD-10-PCS | Mod: TH,S$PBB,, | Performed by: OBSTETRICS & GYNECOLOGY

## 2022-07-18 PROCEDURE — 99213 OFFICE O/P EST LOW 20 MIN: CPT | Mod: TH,S$PBB,, | Performed by: OBSTETRICS & GYNECOLOGY

## 2022-07-18 PROCEDURE — 99999 PR PBB SHADOW E&M-EST. PATIENT-LVL II: ICD-10-PCS | Mod: PBBFAC,,, | Performed by: OBSTETRICS & GYNECOLOGY

## 2022-07-18 PROCEDURE — 99999 PR PBB SHADOW E&M-EST. PATIENT-LVL II: CPT | Mod: PBBFAC,,, | Performed by: OBSTETRICS & GYNECOLOGY

## 2022-07-18 NOTE — Clinical Note
Needs first thing in the morning appointments, 8AM. Any day of the week.   Please make all her OB FU visits until delivery. She will need a visit every two weeks until 36 weeks. Then a visit weekly until 40 weeks. She will need a growth US at 36 weeks.

## 2022-07-18 NOTE — PROGRESS NOTES
Complaints today:None, Good fetal movements reported,No Bleeding or pains    /62   Wt 62.2 kg (137 lb 2 oz)   LMP 2021 (Approximate)   BMI 25.08 kg/m²     21 y.o., at 32w0d by Estimated Date of Delivery: 22  There is no problem list on file for this patient.    OB History    Para Term  AB Living   1             SAB IAB Ectopic Multiple Live Births                  # Outcome Date GA Lbr Morris/2nd Weight Sex Delivery Anes PTL Lv   1 Current                Dating reviewed    Allergies and problem list reviewed and updated    Medical and surgical history reviewed    Prenatal labs reviewed and updated    Physical Exam:  ABD: soft, gravid, nontender,       Assessment:  Nany was seen today for routine prenatal visit.    Diagnoses and all orders for this visit:    Encounter for supervision of other normal pregnancy, third trimester    32 weeks gestation of pregnancy  -     POCT URINE DIPSTICK WITHOUT MICROSCOPE          Plan:   EDC 2022 by LMP  22 28wk EFW 1117g, 23%  Pap: 2022 ASCUS & HPV, repeat one year  Vaccines: s/p COVID & Tdap, No flu  Genetics: NIPT neg, baby Girl  Epidual vs natural: unsure  Formula  Contraception: DepoProvera  Ped: Unsure  transferring to Dr. Fletcher from Dr. Hinojosa due to maternity leave     follow up 2 Weeks, bleeding/pain precautions , kick counts, labor precautions given    Veronica Fletcher M.D.  Obstetrics and Gynecology

## 2022-07-19 DIAGNOSIS — Z3A.36 36 WEEKS GESTATION OF PREGNANCY: Primary | ICD-10-CM

## 2022-07-27 ENCOUNTER — ROUTINE PRENATAL (OUTPATIENT)
Dept: OBSTETRICS AND GYNECOLOGY | Facility: CLINIC | Age: 22
End: 2022-07-27
Payer: MEDICAID

## 2022-07-27 ENCOUNTER — LAB VISIT (OUTPATIENT)
Dept: LAB | Facility: HOSPITAL | Age: 22
End: 2022-07-27
Attending: OBSTETRICS & GYNECOLOGY
Payer: MEDICAID

## 2022-07-27 ENCOUNTER — PATIENT MESSAGE (OUTPATIENT)
Dept: OBSTETRICS AND GYNECOLOGY | Facility: CLINIC | Age: 22
End: 2022-07-27

## 2022-07-27 VITALS — DIASTOLIC BLOOD PRESSURE: 46 MMHG | SYSTOLIC BLOOD PRESSURE: 90 MMHG | WEIGHT: 136.44 LBS | BODY MASS INDEX: 24.96 KG/M2

## 2022-07-27 DIAGNOSIS — Z3A.33 33 WEEKS GESTATION OF PREGNANCY: ICD-10-CM

## 2022-07-27 DIAGNOSIS — R42 DIZZY SPELLS: Primary | ICD-10-CM

## 2022-07-27 DIAGNOSIS — R42 DIZZY SPELLS: ICD-10-CM

## 2022-07-27 LAB
ALBUMIN SERPL BCP-MCNC: 3 G/DL (ref 3.5–5.2)
ALP SERPL-CCNC: 72 U/L (ref 55–135)
ALT SERPL W/O P-5'-P-CCNC: 10 U/L (ref 10–44)
ANION GAP SERPL CALC-SCNC: 10 MMOL/L (ref 8–16)
AST SERPL-CCNC: 13 U/L (ref 10–40)
BASOPHILS # BLD AUTO: 0.02 K/UL (ref 0–0.2)
BASOPHILS NFR BLD: 0.2 % (ref 0–1.9)
BILIRUB SERPL-MCNC: 0.4 MG/DL (ref 0.1–1)
BILIRUB SERPL-MCNC: NORMAL MG/DL
BLOOD URINE, POC: NORMAL
BUN SERPL-MCNC: 6 MG/DL (ref 6–20)
CALCIUM SERPL-MCNC: 9.4 MG/DL (ref 8.7–10.5)
CHLORIDE SERPL-SCNC: 104 MMOL/L (ref 95–110)
CLARITY, POC UA: CLEAR
CO2 SERPL-SCNC: 24 MMOL/L (ref 23–29)
COLOR, POC UA: YELLOW
CREAT SERPL-MCNC: 0.5 MG/DL (ref 0.5–1.4)
DIFFERENTIAL METHOD: ABNORMAL
EOSINOPHIL # BLD AUTO: 0 K/UL (ref 0–0.5)
EOSINOPHIL NFR BLD: 0.1 % (ref 0–8)
ERYTHROCYTE [DISTWIDTH] IN BLOOD BY AUTOMATED COUNT: 12.3 % (ref 11.5–14.5)
EST. GFR  (AFRICAN AMERICAN): >60 ML/MIN/1.73 M^2
EST. GFR  (NON AFRICAN AMERICAN): >60 ML/MIN/1.73 M^2
GLUCOSE SERPL-MCNC: 87 MG/DL (ref 70–110)
GLUCOSE UR QL STRIP: NORMAL
HCT VFR BLD AUTO: 30.6 % (ref 37–48.5)
HGB BLD-MCNC: 10.1 G/DL (ref 12–16)
IMM GRANULOCYTES # BLD AUTO: 0.02 K/UL (ref 0–0.04)
IMM GRANULOCYTES NFR BLD AUTO: 0.2 % (ref 0–0.5)
KETONES UR QL STRIP: NORMAL
LEUKOCYTE ESTERASE URINE, POC: NORMAL
LYMPHOCYTES # BLD AUTO: 1.8 K/UL (ref 1–4.8)
LYMPHOCYTES NFR BLD: 20.3 % (ref 18–48)
MCH RBC QN AUTO: 30.9 PG (ref 27–31)
MCHC RBC AUTO-ENTMCNC: 33 G/DL (ref 32–36)
MCV RBC AUTO: 94 FL (ref 82–98)
MONOCYTES # BLD AUTO: 0.6 K/UL (ref 0.3–1)
MONOCYTES NFR BLD: 6.4 % (ref 4–15)
NEUTROPHILS # BLD AUTO: 6.4 K/UL (ref 1.8–7.7)
NEUTROPHILS NFR BLD: 72.8 % (ref 38–73)
NITRITE, POC UA: NORMAL
NRBC BLD-RTO: 0 /100 WBC
PH, POC UA: 8
PLATELET # BLD AUTO: 304 K/UL (ref 150–450)
PMV BLD AUTO: 10.2 FL (ref 9.2–12.9)
POTASSIUM SERPL-SCNC: 4.2 MMOL/L (ref 3.5–5.1)
PROT SERPL-MCNC: 6.8 G/DL (ref 6–8.4)
PROTEIN, POC: NORMAL
RBC # BLD AUTO: 3.27 M/UL (ref 4–5.4)
SODIUM SERPL-SCNC: 138 MMOL/L (ref 136–145)
SPECIFIC GRAVITY, POC UA: 1
TSH SERPL DL<=0.005 MIU/L-ACNC: 0.44 UIU/ML (ref 0.4–4)
UROBILINOGEN, POC UA: NORMAL
WBC # BLD AUTO: 8.74 K/UL (ref 3.9–12.7)

## 2022-07-27 PROCEDURE — 99213 PR OFFICE/OUTPT VISIT, EST, LEVL III, 20-29 MIN: ICD-10-PCS | Mod: TH,S$PBB,, | Performed by: OBSTETRICS & GYNECOLOGY

## 2022-07-27 PROCEDURE — 36415 COLL VENOUS BLD VENIPUNCTURE: CPT | Mod: PN | Performed by: OBSTETRICS & GYNECOLOGY

## 2022-07-27 PROCEDURE — 85025 COMPLETE CBC W/AUTO DIFF WBC: CPT | Performed by: OBSTETRICS & GYNECOLOGY

## 2022-07-27 PROCEDURE — 99999 PR PBB SHADOW E&M-EST. PATIENT-LVL III: ICD-10-PCS | Mod: PBBFAC,,, | Performed by: OBSTETRICS & GYNECOLOGY

## 2022-07-27 PROCEDURE — 84443 ASSAY THYROID STIM HORMONE: CPT | Performed by: OBSTETRICS & GYNECOLOGY

## 2022-07-27 PROCEDURE — 99213 OFFICE O/P EST LOW 20 MIN: CPT | Mod: TH,S$PBB,, | Performed by: OBSTETRICS & GYNECOLOGY

## 2022-07-27 PROCEDURE — 99213 OFFICE O/P EST LOW 20 MIN: CPT | Mod: PBBFAC,PN | Performed by: OBSTETRICS & GYNECOLOGY

## 2022-07-27 PROCEDURE — 80053 COMPREHEN METABOLIC PANEL: CPT | Performed by: OBSTETRICS & GYNECOLOGY

## 2022-07-27 PROCEDURE — 81002 URINALYSIS NONAUTO W/O SCOPE: CPT | Mod: PBBFAC,PN | Performed by: OBSTETRICS & GYNECOLOGY

## 2022-07-27 PROCEDURE — 99999 PR PBB SHADOW E&M-EST. PATIENT-LVL III: CPT | Mod: PBBFAC,,, | Performed by: OBSTETRICS & GYNECOLOGY

## 2022-07-28 ENCOUNTER — PATIENT MESSAGE (OUTPATIENT)
Dept: OBSTETRICS AND GYNECOLOGY | Facility: CLINIC | Age: 22
End: 2022-07-28
Payer: MEDICAID

## 2022-07-31 NOTE — PROGRESS NOTES
Complaints today: Has been having periods of severe dizziness, to the point of nearly passing out.  First time was getting out of bed and felt really dizzy--drank water and ate pretzels and felt better, 2nd event was sitting on couch, had same feeling, aand passed after drinking water and eating gummies.  Third time was standing, had same feeling, went and sat down until it passed.  Today was driving and had another episode--had to pull over until it passed.  Chest gets tight, has hard time breathing, sweats all over bod    BP (!) 90/46   Wt 61.9 kg (136 lb 7.4 oz)   LMP 2021 (Approximate)   BMI 24.96 kg/m²     21 y.o., at 33w2d by Estimated Date of Delivery: 22  There is no problem list on file for this patient.    OB History    Para Term  AB Living   1             SAB IAB Ectopic Multiple Live Births                  # Outcome Date GA Lbr Morris/2nd Weight Sex Delivery Anes PTL Lv   1 Current                Dating reviewed    Allergies and problem list reviewed and updated    Medical and surgical history reviewed    Prenatal labs reviewed and updated    Physical Exam:  ABD: soft, gravid, nontender,       Assessment:  Nany was seen today for routine prenatal visit.    Diagnoses and all orders for this visit:    Dizzy spells  -     CBC Auto Differential; Future  -     COMPREHENSIVE METABOLIC PANEL; Future  -     TSH; Future  -     Ambulatory referral/consult to Cardiology; Future    33 weeks gestation of pregnancy  -     POCT URINE DIPSTICK WITHOUT MICROSCOPE          Plan:   EDC 2022 by LMP  22 28wk EFW 1117g, 23%  Pap: 2022 ASCUS & HPV, repeat one year  Vaccines: s/p COVID & Tdap, No flu  Genetics: NIPT neg, baby Girl  Epidual vs natural: unsure  Formula  Contraception: DepoProvera  Ped: Unsure       follow up 2 Weeks, bleeding/pain precautions , kick counts, labor precautions given    Veronica Fletcher M.D.  Obstetrics and Gynecology

## 2022-08-02 ENCOUNTER — ROUTINE PRENATAL (OUTPATIENT)
Dept: OBSTETRICS AND GYNECOLOGY | Facility: CLINIC | Age: 22
End: 2022-08-02
Payer: MEDICAID

## 2022-08-02 ENCOUNTER — LAB VISIT (OUTPATIENT)
Dept: LAB | Facility: HOSPITAL | Age: 22
End: 2022-08-02
Attending: OBSTETRICS & GYNECOLOGY
Payer: MEDICAID

## 2022-08-02 VITALS — WEIGHT: 136.25 LBS | BODY MASS INDEX: 24.92 KG/M2 | DIASTOLIC BLOOD PRESSURE: 60 MMHG | SYSTOLIC BLOOD PRESSURE: 98 MMHG

## 2022-08-02 DIAGNOSIS — O99.013 ANEMIA DURING PREGNANCY IN THIRD TRIMESTER: ICD-10-CM

## 2022-08-02 DIAGNOSIS — Z34.83 ENCOUNTER FOR SUPERVISION OF OTHER NORMAL PREGNANCY, THIRD TRIMESTER: Primary | ICD-10-CM

## 2022-08-02 DIAGNOSIS — Z3A.34 34 WEEKS GESTATION OF PREGNANCY: ICD-10-CM

## 2022-08-02 LAB
BASOPHILS # BLD AUTO: 0.01 K/UL (ref 0–0.2)
BASOPHILS NFR BLD: 0.1 % (ref 0–1.9)
BILIRUB SERPL-MCNC: NORMAL MG/DL
BLOOD URINE, POC: NORMAL
CLARITY, POC UA: NORMAL
COLOR, POC UA: NORMAL
DIFFERENTIAL METHOD: ABNORMAL
EOSINOPHIL # BLD AUTO: 0 K/UL (ref 0–0.5)
EOSINOPHIL NFR BLD: 0.1 % (ref 0–8)
ERYTHROCYTE [DISTWIDTH] IN BLOOD BY AUTOMATED COUNT: 12.4 % (ref 11.5–14.5)
FERRITIN SERPL-MCNC: 6 NG/ML (ref 20–300)
GLUCOSE UR QL STRIP: NORMAL
HCT VFR BLD AUTO: 30.7 % (ref 37–48.5)
HGB BLD-MCNC: 10.2 G/DL (ref 12–16)
IMM GRANULOCYTES # BLD AUTO: 0.02 K/UL (ref 0–0.04)
IMM GRANULOCYTES NFR BLD AUTO: 0.3 % (ref 0–0.5)
IRON SERPL-MCNC: 47 UG/DL (ref 30–160)
KETONES UR QL STRIP: NORMAL
LEUKOCYTE ESTERASE URINE, POC: NORMAL
LYMPHOCYTES # BLD AUTO: 1.6 K/UL (ref 1–4.8)
LYMPHOCYTES NFR BLD: 23.4 % (ref 18–48)
MCH RBC QN AUTO: 31.8 PG (ref 27–31)
MCHC RBC AUTO-ENTMCNC: 33.2 G/DL (ref 32–36)
MCV RBC AUTO: 96 FL (ref 82–98)
MONOCYTES # BLD AUTO: 0.4 K/UL (ref 0.3–1)
MONOCYTES NFR BLD: 5.6 % (ref 4–15)
NEUTROPHILS # BLD AUTO: 4.8 K/UL (ref 1.8–7.7)
NEUTROPHILS NFR BLD: 70.5 % (ref 38–73)
NITRITE, POC UA: NORMAL
NRBC BLD-RTO: 0 /100 WBC
PH, POC UA: 9
PLATELET # BLD AUTO: 290 K/UL (ref 150–450)
PMV BLD AUTO: 10.1 FL (ref 9.2–12.9)
PROTEIN, POC: NORMAL
RBC # BLD AUTO: 3.21 M/UL (ref 4–5.4)
SATURATED IRON: 8 % (ref 20–50)
SPECIFIC GRAVITY, POC UA: 1
TOTAL IRON BINDING CAPACITY: 574 UG/DL (ref 250–450)
TRANSFERRIN SERPL-MCNC: 388 MG/DL (ref 200–375)
UROBILINOGEN, POC UA: NORMAL
WBC # BLD AUTO: 6.84 K/UL (ref 3.9–12.7)

## 2022-08-02 PROCEDURE — 99212 OFFICE O/P EST SF 10 MIN: CPT | Mod: PBBFAC,TH,PN | Performed by: OBSTETRICS & GYNECOLOGY

## 2022-08-02 PROCEDURE — 99213 OFFICE O/P EST LOW 20 MIN: CPT | Mod: TH,S$PBB,, | Performed by: OBSTETRICS & GYNECOLOGY

## 2022-08-02 PROCEDURE — 85025 COMPLETE CBC W/AUTO DIFF WBC: CPT | Performed by: OBSTETRICS & GYNECOLOGY

## 2022-08-02 PROCEDURE — 81002 URINALYSIS NONAUTO W/O SCOPE: CPT | Mod: PBBFAC,PN | Performed by: OBSTETRICS & GYNECOLOGY

## 2022-08-02 PROCEDURE — 99999 PR PBB SHADOW E&M-EST. PATIENT-LVL II: ICD-10-PCS | Mod: PBBFAC,,, | Performed by: OBSTETRICS & GYNECOLOGY

## 2022-08-02 PROCEDURE — 82728 ASSAY OF FERRITIN: CPT | Performed by: OBSTETRICS & GYNECOLOGY

## 2022-08-02 PROCEDURE — 84466 ASSAY OF TRANSFERRIN: CPT | Performed by: OBSTETRICS & GYNECOLOGY

## 2022-08-02 PROCEDURE — 36415 COLL VENOUS BLD VENIPUNCTURE: CPT | Mod: PN | Performed by: OBSTETRICS & GYNECOLOGY

## 2022-08-02 PROCEDURE — 99213 PR OFFICE/OUTPT VISIT, EST, LEVL III, 20-29 MIN: ICD-10-PCS | Mod: TH,S$PBB,, | Performed by: OBSTETRICS & GYNECOLOGY

## 2022-08-02 PROCEDURE — 99999 PR PBB SHADOW E&M-EST. PATIENT-LVL II: CPT | Mod: PBBFAC,,, | Performed by: OBSTETRICS & GYNECOLOGY

## 2022-08-02 RX ORDER — CEPHALEXIN 500 MG/1
500 CAPSULE ORAL 3 TIMES DAILY
COMMUNITY
Start: 2022-07-28 | End: 2022-08-23

## 2022-08-02 NOTE — PROGRESS NOTES
Complaints today: none    BP 98/60   Wt 61.8 kg (136 lb 3.9 oz)   LMP 2021 (Approximate)   BMI 24.92 kg/m²     21 y.o., at 34w1d by Estimated Date of Delivery: 22  There is no problem list on file for this patient.    OB History    Para Term  AB Living   1             SAB IAB Ectopic Multiple Live Births                  # Outcome Date GA Lbr Morris/2nd Weight Sex Delivery Anes PTL Lv   1 Current                Dating reviewed    Allergies and problem list reviewed and updated    Medical and surgical history reviewed    Prenatal labs reviewed and updated    Physical Exam:  ABD: soft, gravid, nontender,       Assessment:  Nany was seen today for routine prenatal visit.    Diagnoses and all orders for this visit:    Encounter for supervision of other normal pregnancy, third trimester    34 weeks gestation of pregnancy    Anemia during pregnancy in third trimester          Plan:   EDC 2022 by LMP  22 28wk EFW 1117g, 23%  Pap: 2022 ASCUS & HPV, repeat one year  Vaccines: s/p COVID & Tdap, No flu  Genetics: NIPT neg, baby Girl  Epidual vs natural: unsure  Formula  Contraception: DepoProvera  Ped: Unsure  Dizzy spells: work up showed anemia, Iron studies ordered. Card referral previously placed.        follow up 2 Weeks, bleeding/pain precautions , kick counts, labor precautions given    Veronica Fletcher M.D.  Obstetrics and Gynecology

## 2022-08-15 ENCOUNTER — TELEPHONE (OUTPATIENT)
Dept: OBSTETRICS AND GYNECOLOGY | Facility: CLINIC | Age: 22
End: 2022-08-15
Payer: MEDICAID

## 2022-08-15 ENCOUNTER — ROUTINE PRENATAL (OUTPATIENT)
Dept: OBSTETRICS AND GYNECOLOGY | Facility: CLINIC | Age: 22
End: 2022-08-15
Payer: MEDICAID

## 2022-08-15 ENCOUNTER — TELEPHONE (OUTPATIENT)
Dept: CARDIOLOGY | Facility: CLINIC | Age: 22
End: 2022-08-15
Payer: MEDICAID

## 2022-08-15 ENCOUNTER — HOSPITAL ENCOUNTER (OUTPATIENT)
Dept: RADIOLOGY | Facility: HOSPITAL | Age: 22
Discharge: HOME | End: 2022-08-15
Attending: OBSTETRICS & GYNECOLOGY
Payer: MEDICAID

## 2022-08-15 VITALS — BODY MASS INDEX: 25.28 KG/M2 | WEIGHT: 138.25 LBS | DIASTOLIC BLOOD PRESSURE: 64 MMHG | SYSTOLIC BLOOD PRESSURE: 92 MMHG

## 2022-08-15 DIAGNOSIS — Z3A.36 36 WEEKS GESTATION OF PREGNANCY: ICD-10-CM

## 2022-08-15 DIAGNOSIS — Z34.83 ENCOUNTER FOR SUPERVISION OF OTHER NORMAL PREGNANCY, THIRD TRIMESTER: Primary | ICD-10-CM

## 2022-08-15 DIAGNOSIS — O99.013 ANEMIA DURING PREGNANCY IN THIRD TRIMESTER: ICD-10-CM

## 2022-08-15 LAB
BILIRUB SERPL-MCNC: NORMAL MG/DL
BLOOD URINE, POC: NORMAL
CLARITY, POC UA: CLEAR
COLOR, POC UA: YELLOW
GLUCOSE UR QL STRIP: NORMAL
KETONES UR QL STRIP: NORMAL
LEUKOCYTE ESTERASE URINE, POC: NORMAL
NITRITE, POC UA: NORMAL
PH, POC UA: 7
PROTEIN, POC: NORMAL
SPECIFIC GRAVITY, POC UA: 1.01
UROBILINOGEN, POC UA: NORMAL

## 2022-08-15 PROCEDURE — 99212 OFFICE O/P EST SF 10 MIN: CPT | Mod: PBBFAC,TH,PN | Performed by: OBSTETRICS & GYNECOLOGY

## 2022-08-15 PROCEDURE — 81002 URINALYSIS NONAUTO W/O SCOPE: CPT | Mod: PBBFAC,PN | Performed by: OBSTETRICS & GYNECOLOGY

## 2022-08-15 PROCEDURE — 87081 CULTURE SCREEN ONLY: CPT | Performed by: OBSTETRICS & GYNECOLOGY

## 2022-08-15 PROCEDURE — 99213 OFFICE O/P EST LOW 20 MIN: CPT | Mod: TH,S$PBB,, | Performed by: OBSTETRICS & GYNECOLOGY

## 2022-08-15 PROCEDURE — 99999 PR PBB SHADOW E&M-EST. PATIENT-LVL II: ICD-10-PCS | Mod: PBBFAC,,, | Performed by: OBSTETRICS & GYNECOLOGY

## 2022-08-15 PROCEDURE — 99999 PR PBB SHADOW E&M-EST. PATIENT-LVL II: CPT | Mod: PBBFAC,,, | Performed by: OBSTETRICS & GYNECOLOGY

## 2022-08-15 PROCEDURE — 76816 US OB FOLLOW UP EA GESTATION TRANSABDOMINAL: ICD-10-PCS | Mod: 26,,, | Performed by: RADIOLOGY

## 2022-08-15 PROCEDURE — 76816 OB US FOLLOW-UP PER FETUS: CPT | Mod: 26,,, | Performed by: RADIOLOGY

## 2022-08-15 PROCEDURE — 76816 OB US FOLLOW-UP PER FETUS: CPT | Mod: TC,PN

## 2022-08-15 PROCEDURE — 99213 PR OFFICE/OUTPT VISIT, EST, LEVL III, 20-29 MIN: ICD-10-PCS | Mod: TH,S$PBB,, | Performed by: OBSTETRICS & GYNECOLOGY

## 2022-08-15 RX ORDER — FERROUS SULFATE 324(65)MG
324 TABLET, DELAYED RELEASE (ENTERIC COATED) ORAL DAILY
Qty: 30 TABLET | Refills: 11 | Status: SHIPPED | OUTPATIENT
Start: 2022-08-15

## 2022-08-15 NOTE — TELEPHONE ENCOUNTER
Spoke to pt over the phone, inform her that we currently don't have any medicaid appointments available. Pt mariana

## 2022-08-15 NOTE — TELEPHONE ENCOUNTER
----- Message from Nevaeh Black sent at 8/15/2022 12:51 PM CDT -----  Regarding: returning call  Contact: patient  Type:  Patient Returning Call    Who Called:  patient  Who Left Message for Patient:  unknown  Does the patient know what this is regarding?:  appointment  Best Call Back Number:  [phone] (home)   Additional Information:  Please call patient. Thanks!

## 2022-08-15 NOTE — PROGRESS NOTES
Complaints today: none     BP 92/64   Wt 62.7 kg (138 lb 3.7 oz)   LMP 2021 (Approximate)   BMI 25.28 kg/m²     21 y.o., at 36w0d by Estimated Date of Delivery: 22  There is no problem list on file for this patient.    OB History    Para Term  AB Living   1             SAB IAB Ectopic Multiple Live Births                  # Outcome Date GA Lbr Morris/2nd Weight Sex Delivery Anes PTL Lv   1 Current                Dating reviewed    Allergies and problem list reviewed and updated    Medical and surgical history reviewed    Prenatal labs reviewed and updated    Physical Exam:  ABD: soft, gravid, nontender,       Assessment:    EDC 2022 by LMP  22 28wk EFW 1117g, 23%  Pap: 2022 ASCUS & HPV, repeat one year  Vaccines: s/p COVID & Tdap, No flu  Genetics: NIPT neg, baby Girl  Epidual vs natural: unsure  Formula  Contraception: DepoProvera  Ped: Unsure  Symptomatic anemia: Start Iron  Dizzy & SOB: Cards referral sent  Nany was seen today for routine prenatal visit.    Diagnoses and all orders for this visit:    36 weeks gestation of pregnancy  -     POCT URINE DIPSTICK WITHOUT MICROSCOPE  -     STREP B SCREEN, VAGINAL / RECTAL    Encounter for supervision of other normal pregnancy, third trimester         follow up 1 Weeks, bleeding/pain precautions , kick counts, labor precautions given    Veronica Fletcher M.D.  Obstetrics and Gynecology

## 2022-08-15 NOTE — TELEPHONE ENCOUNTER
Good morning, referred to cardiology for dizziness; patient is 36 weeks pregnant.  I do not see anything available.  Can you help?

## 2022-08-16 ENCOUNTER — PATIENT MESSAGE (OUTPATIENT)
Dept: OBSTETRICS AND GYNECOLOGY | Facility: CLINIC | Age: 22
End: 2022-08-16
Payer: MEDICAID

## 2022-08-16 DIAGNOSIS — R42 DIZZY SPELLS: Primary | ICD-10-CM

## 2022-08-16 RX ORDER — HYDROXYZINE PAMOATE 50 MG/1
50 CAPSULE ORAL EVERY 4 HOURS PRN
Qty: 30 CAPSULE | Refills: 0 | Status: ON HOLD | OUTPATIENT
Start: 2022-08-16 | End: 2022-09-16 | Stop reason: HOSPADM

## 2022-08-16 NOTE — LETTER
August 16, 2022    Nany Travis  [address] [place] [address]              [place] [address], [address] [address]  Phone: [phone]  Fax: [phone]    To Whom It May Concern:    Ms. Travis is currently under our care for pregnancy. Due to health issues relating to pregnancy, she will need to start her leave from work beginning today, Tuesday, 8/16/2022. Estimated Date of Delivery: 9/12/2022.    If you have any further questions, please contact my office regarding.    Sincerely,      Veronica Fletcher MD

## 2022-08-18 LAB — BACTERIA SPEC AEROBE CULT: NORMAL

## 2022-08-23 ENCOUNTER — ROUTINE PRENATAL (OUTPATIENT)
Dept: OBSTETRICS AND GYNECOLOGY | Facility: CLINIC | Age: 22
End: 2022-08-23
Payer: MEDICAID

## 2022-08-23 VITALS
BODY MASS INDEX: 25.52 KG/M2 | WEIGHT: 139.56 LBS | SYSTOLIC BLOOD PRESSURE: 102 MMHG | DIASTOLIC BLOOD PRESSURE: 60 MMHG

## 2022-08-23 DIAGNOSIS — Z3A.37 37 WEEKS GESTATION OF PREGNANCY: Primary | ICD-10-CM

## 2022-08-23 LAB
BILIRUB SERPL-MCNC: NORMAL MG/DL
BLOOD URINE, POC: NORMAL
CLARITY, POC UA: CLEAR
COLOR, POC UA: NORMAL
GLUCOSE UR QL STRIP: NORMAL
KETONES UR QL STRIP: NORMAL
LEUKOCYTE ESTERASE URINE, POC: NORMAL
NITRITE, POC UA: NORMAL
PH, POC UA: 6
PROTEIN, POC: NORMAL
SPECIFIC GRAVITY, POC UA: 1.02
UROBILINOGEN, POC UA: NORMAL

## 2022-08-23 PROCEDURE — 99999 PR PBB SHADOW E&M-EST. PATIENT-LVL II: CPT | Mod: PBBFAC,,, | Performed by: OBSTETRICS & GYNECOLOGY

## 2022-08-23 PROCEDURE — 99212 OFFICE O/P EST SF 10 MIN: CPT | Mod: PBBFAC,PN | Performed by: OBSTETRICS & GYNECOLOGY

## 2022-08-23 PROCEDURE — 99213 PR OFFICE/OUTPT VISIT, EST, LEVL III, 20-29 MIN: ICD-10-PCS | Mod: TH,S$PBB,, | Performed by: OBSTETRICS & GYNECOLOGY

## 2022-08-23 PROCEDURE — 99999 PR PBB SHADOW E&M-EST. PATIENT-LVL II: ICD-10-PCS | Mod: PBBFAC,,, | Performed by: OBSTETRICS & GYNECOLOGY

## 2022-08-23 PROCEDURE — 99213 OFFICE O/P EST LOW 20 MIN: CPT | Mod: TH,S$PBB,, | Performed by: OBSTETRICS & GYNECOLOGY

## 2022-08-23 PROCEDURE — 81002 URINALYSIS NONAUTO W/O SCOPE: CPT | Mod: PBBFAC,PN | Performed by: OBSTETRICS & GYNECOLOGY

## 2022-08-23 NOTE — PROGRESS NOTES
Subjective:      Nany Travis is a 22 y.o. female being seen today for her obstetrical visit. She is at 37w1d gestation. Patient reports backache. Fetal movement: normal.    Menstrual History:  OB History        1    Para        Term                AB        Living           SAB        IAB        Ectopic        Multiple        Live Births                      Patient's last menstrual period was 2021 (approximate).       OB ROS: no vaginal bleeding, no leakage of fluid, no contractions/cramping, no vaginal discharge. +fetal movement.    Objective:      /60   Wt 63.3 kg (139 lb 8.8 oz)   LMP 2021 (Approximate)   BMI 25.52 kg/m²     FHT: 145   Uterine Size: size equal to dates        Assessment:      Pregnancy 37w1d      Plan:     EDC 2022 by LMP  GBS neg  22 28wk EFW 1117g, 23%  Pap: 2022 ASCUS & HPV, repeat one year  Vaccines: s/p COVID & Tdap, No flu  Genetics: NIPT neg, baby Girl  Epidual vs natural: unsure  Formula  Contraception: DepoProvera  Ped: [facility]  Symptomatic anemia: Start Iron  Dizzy & SOB: Cards referral sent, EKG, ECHO ordered, Taking Iron for anemia, symptoms improved        37 weeks gestation of pregnancy  -     POCT URINE DIPSTICK WITHOUT MICROSCOPE        Follow up in 1 weeks.       I reviewed today her blood pressure, weight gain, urine results and  fetal heart rate.  I have reviewed the previous labs and ultrasound with the patient.   I have answered questions to her satisfaction and total of 20 minutes spend today

## 2022-08-30 ENCOUNTER — ROUTINE PRENATAL (OUTPATIENT)
Dept: OBSTETRICS AND GYNECOLOGY | Facility: CLINIC | Age: 22
End: 2022-08-30
Payer: MEDICAID

## 2022-08-30 VITALS
SYSTOLIC BLOOD PRESSURE: 100 MMHG | DIASTOLIC BLOOD PRESSURE: 62 MMHG | WEIGHT: 139.75 LBS | BODY MASS INDEX: 25.56 KG/M2

## 2022-08-30 DIAGNOSIS — Z3A.38 38 WEEKS GESTATION OF PREGNANCY: ICD-10-CM

## 2022-08-30 DIAGNOSIS — Z34.83 ENCOUNTER FOR SUPERVISION OF OTHER NORMAL PREGNANCY, THIRD TRIMESTER: Primary | ICD-10-CM

## 2022-08-30 LAB
BILIRUB SERPL-MCNC: NORMAL MG/DL
BLOOD URINE, POC: NORMAL
CLARITY, POC UA: CLEAR
COLOR, POC UA: YELLOW
GLUCOSE UR QL STRIP: NORMAL
KETONES UR QL STRIP: NORMAL
LEUKOCYTE ESTERASE URINE, POC: NORMAL
NITRITE, POC UA: NORMAL
PH, POC UA: 5
PROTEIN, POC: NORMAL
SPECIFIC GRAVITY, POC UA: 1.02
UROBILINOGEN, POC UA: NORMAL

## 2022-08-30 PROCEDURE — 99999 PR PBB SHADOW E&M-EST. PATIENT-LVL II: CPT | Mod: PBBFAC,,, | Performed by: OBSTETRICS & GYNECOLOGY

## 2022-08-30 PROCEDURE — 99999 PR PBB SHADOW E&M-EST. PATIENT-LVL II: ICD-10-PCS | Mod: PBBFAC,,, | Performed by: OBSTETRICS & GYNECOLOGY

## 2022-08-30 PROCEDURE — 99213 OFFICE O/P EST LOW 20 MIN: CPT | Mod: TH,S$PBB,, | Performed by: OBSTETRICS & GYNECOLOGY

## 2022-08-30 PROCEDURE — 99212 OFFICE O/P EST SF 10 MIN: CPT | Mod: PBBFAC,TH,PN | Performed by: OBSTETRICS & GYNECOLOGY

## 2022-08-30 PROCEDURE — 99213 PR OFFICE/OUTPT VISIT, EST, LEVL III, 20-29 MIN: ICD-10-PCS | Mod: TH,S$PBB,, | Performed by: OBSTETRICS & GYNECOLOGY

## 2022-08-30 PROCEDURE — 81002 URINALYSIS NONAUTO W/O SCOPE: CPT | Mod: PBBFAC,PN | Performed by: OBSTETRICS & GYNECOLOGY

## 2022-08-30 NOTE — PROGRESS NOTES
Subjective:      Nany Travis is a 22 y.o. female being seen today for her obstetrical visit. She is at 38w1d gestation. Patient reports occasional contractions. Fetal movement: normal.    Menstrual History:  OB History        1    Para        Term                AB        Living           SAB        IAB        Ectopic        Multiple        Live Births                      Patient's last menstrual period was 2021 (approximate).       OB ROS: no vaginal bleeding, no leakage of fluid, no contractions/cramping, no vaginal discharge. +fetal movement.    Objective:      /62   Wt 63.4 kg (139 lb 12.4 oz)   LMP 2021 (Approximate)   BMI 25.56 kg/m²     FHT:    Uterine Size: size equal to dates        Assessment:      Pregnancy 38w1d      Plan:     EDC 2022 by LMP  GBS neg  22 28wk EFW 1117g, 23%  Pap: 2022 ASCUS & HPV, repeat one year  Vaccines: s/p COVID & Tdap, No flu  Genetics: NIPT neg, baby Girl  Epidual vs natural: unsure  Formula  Contraception: DepoProvera  Ped: [facility]    Symptomatic anemia: Start Iron  Dizzy & SOB: Cards referral sent, EKG, ECHO ordered, Taking Iron for anemia, symptoms improved        Encounter for supervision of other normal pregnancy, third trimester    38 weeks gestation of pregnancy  -     POCT URINE DIPSTICK WITHOUT MICROSCOPE        Follow up in 1 weeks.       I reviewed today her blood pressure, weight gain, urine results and  fetal heart rate.  I have reviewed the previous labs and ultrasound with the patient.   I have answered questions to her satisfaction and total of 20 minutes spend today

## 2022-09-04 ENCOUNTER — PATIENT MESSAGE (OUTPATIENT)
Dept: OBSTETRICS AND GYNECOLOGY | Facility: CLINIC | Age: 22
End: 2022-09-04
Payer: MEDICAID

## 2022-09-06 ENCOUNTER — ROUTINE PRENATAL (OUTPATIENT)
Dept: OBSTETRICS AND GYNECOLOGY | Facility: CLINIC | Age: 22
End: 2022-09-06
Payer: MEDICAID

## 2022-09-06 VITALS
DIASTOLIC BLOOD PRESSURE: 62 MMHG | WEIGHT: 140.44 LBS | SYSTOLIC BLOOD PRESSURE: 108 MMHG | BODY MASS INDEX: 25.69 KG/M2

## 2022-09-06 DIAGNOSIS — Z3A.39 39 WEEKS GESTATION OF PREGNANCY: ICD-10-CM

## 2022-09-06 DIAGNOSIS — Z34.83 ENCOUNTER FOR SUPERVISION OF OTHER NORMAL PREGNANCY, THIRD TRIMESTER: Primary | ICD-10-CM

## 2022-09-06 LAB
BILIRUB SERPL-MCNC: NORMAL MG/DL
BLOOD URINE, POC: NORMAL
CLARITY, POC UA: CLEAR
COLOR, POC UA: YELLOW
GLUCOSE UR QL STRIP: NORMAL
KETONES UR QL STRIP: NORMAL
LEUKOCYTE ESTERASE URINE, POC: NORMAL
NITRITE, POC UA: NORMAL
PH, POC UA: 6
PROTEIN, POC: NORMAL
SPECIFIC GRAVITY, POC UA: NORMAL
UROBILINOGEN, POC UA: NORMAL

## 2022-09-06 PROCEDURE — 99999 PR PBB SHADOW E&M-EST. PATIENT-LVL II: CPT | Mod: PBBFAC,,, | Performed by: OBSTETRICS & GYNECOLOGY

## 2022-09-06 PROCEDURE — 99213 OFFICE O/P EST LOW 20 MIN: CPT | Mod: TH,S$PBB,, | Performed by: OBSTETRICS & GYNECOLOGY

## 2022-09-06 PROCEDURE — 99212 OFFICE O/P EST SF 10 MIN: CPT | Mod: PBBFAC,TH,PN | Performed by: OBSTETRICS & GYNECOLOGY

## 2022-09-06 PROCEDURE — 81002 URINALYSIS NONAUTO W/O SCOPE: CPT | Mod: PBBFAC,PN | Performed by: OBSTETRICS & GYNECOLOGY

## 2022-09-06 PROCEDURE — 99999 PR PBB SHADOW E&M-EST. PATIENT-LVL II: ICD-10-PCS | Mod: PBBFAC,,, | Performed by: OBSTETRICS & GYNECOLOGY

## 2022-09-06 PROCEDURE — 99213 PR OFFICE/OUTPT VISIT, EST, LEVL III, 20-29 MIN: ICD-10-PCS | Mod: TH,S$PBB,, | Performed by: OBSTETRICS & GYNECOLOGY

## 2022-09-06 NOTE — Clinical Note
Schedule Induction: Date: 9/13 Time: 6PM Method: cyctoec Indication: elective GBS: neg  I already scheduled it, just add it to the calender

## 2022-09-06 NOTE — PROGRESS NOTES
Subjective:      Nany Travis is a 22 y.o. female being seen today for her obstetrical visit. She is at 39w1d gestation. Patient reports backache. Fetal movement: normal.    Menstrual History:  OB History        1    Para        Term                AB        Living           SAB        IAB        Ectopic        Multiple        Live Births                      Patient's last menstrual period was 2021 (approximate).       OB ROS: no vaginal bleeding, no leakage of fluid, no contractions/cramping, no vaginal discharge. +fetal movement.    Objective:      /62   Wt 63.7 kg (140 lb 6.9 oz)   LMP 2021 (Approximate)   BMI 25.69 kg/m²     FHT:    Uterine Size: size equal to dates        Assessment:      Pregnancy 39w1d      Plan:     EDC 2022 by LMP  FAST, GBS neg  22 28wk EFW 1117g, 23%  Pap: 2022 ASCUS & HPV, repeat one year  Vaccines: s/p COVID & Tdap, No flu  Genetics: NIPT neg, baby Girl  Epidual vs natural: unsure  Formula  Contraception: DepoProvera  Ped: [facility]    Symptomatic anemia: Start Iron  Dizzy & SOB: Cards referral sent, EKG, ECHO ordered, Taking Iron for anemia, symptoms improved  IOL scheduled  6PM        Encounter for supervision of other normal pregnancy, third trimester    39 weeks gestation of pregnancy        Follow up in 6wk PP       I reviewed today her blood pressure, weight gain, urine results and  fetal heart rate.  I have reviewed the previous labs and ultrasound with the patient.   I have answered questions to her satisfaction and total of 20 minutes spend today

## 2022-09-06 NOTE — LETTER
September 6, 2022    Nany Travis  [id number] [id number]             [facility]  Obstetrics and Gynecology  [address] [place] [id number]  Phone: [phone]  Fax: [phone]   September 6, 2022     Patient: Nany Travis   YOB: 2000   Date of Visit: 9/6/2022       To Whom it May Concern:    Nany Travis was seen in my clinic on 9/6/2022. She will need to complete classes online beginning today, 9/6/2022, due to pregnancy until 6 weeks after delivery.     FRANK: 9/12/2022    Please excuse her from any classes or work missed.    If you have any questions or concerns, please don't hesitate to call.    Sincerely,         Veronica Fletcher MD

## 2022-09-07 ENCOUNTER — PATIENT MESSAGE (OUTPATIENT)
Dept: OBSTETRICS AND GYNECOLOGY | Facility: CLINIC | Age: 22
End: 2022-09-07
Payer: MEDICAID

## 2022-09-12 ENCOUNTER — PATIENT MESSAGE (OUTPATIENT)
Dept: OBSTETRICS AND GYNECOLOGY | Facility: CLINIC | Age: 22
End: 2022-09-12
Payer: MEDICAID

## 2022-10-26 ENCOUNTER — OCCUPATIONAL HEALTH (OUTPATIENT)
Dept: URGENT CARE | Facility: CLINIC | Age: 22
End: 2022-10-26

## 2022-10-26 ENCOUNTER — POSTPARTUM VISIT (OUTPATIENT)
Dept: OBSTETRICS AND GYNECOLOGY | Facility: CLINIC | Age: 22
End: 2022-10-26
Payer: MEDICAID

## 2022-10-26 VITALS
BODY MASS INDEX: 24.01 KG/M2 | WEIGHT: 127.19 LBS | HEIGHT: 61 IN | SYSTOLIC BLOOD PRESSURE: 98 MMHG | DIASTOLIC BLOOD PRESSURE: 64 MMHG

## 2022-10-26 DIAGNOSIS — L92.9 GRANULATION TISSUE AT OBSTETRICAL LACERATION SITE: ICD-10-CM

## 2022-10-26 DIAGNOSIS — Z13.9 ENCOUNTER FOR SCREENING: Primary | ICD-10-CM

## 2022-10-26 DIAGNOSIS — Z30.019 ENCOUNTER FOR FEMALE BIRTH CONTROL: ICD-10-CM

## 2022-10-26 LAB — COLLECTION ONLY: NORMAL

## 2022-10-26 PROCEDURE — 80305 DRUG TEST PRSMV DIR OPT OBS: CPT | Mod: S$GLB,,, | Performed by: EMERGENCY MEDICINE

## 2022-10-26 PROCEDURE — 80305 OOH COLLECTION ONLY DRUG SCREEN: ICD-10-PCS | Mod: S$GLB,,, | Performed by: EMERGENCY MEDICINE

## 2022-10-26 PROCEDURE — 99999 PR PBB SHADOW E&M-EST. PATIENT-LVL II: ICD-10-PCS | Mod: PBBFAC,,, | Performed by: OBSTETRICS & GYNECOLOGY

## 2022-10-26 PROCEDURE — 99999 PR PBB SHADOW E&M-EST. PATIENT-LVL II: CPT | Mod: PBBFAC,,, | Performed by: OBSTETRICS & GYNECOLOGY

## 2022-10-26 PROCEDURE — 17250 CHEM CAUT OF GRANLTJ TISSUE: CPT | Mod: PBBFAC,PN | Performed by: OBSTETRICS & GYNECOLOGY

## 2022-10-26 PROCEDURE — 99212 OFFICE O/P EST SF 10 MIN: CPT | Mod: PBBFAC,PN | Performed by: OBSTETRICS & GYNECOLOGY

## 2022-10-26 PROCEDURE — 17250 CHEM CAUT OF GRANLTJ TISSUE: CPT | Mod: S$PBB,58,, | Performed by: OBSTETRICS & GYNECOLOGY

## 2022-10-26 PROCEDURE — 99213 PR OFFICE/OUTPT VISIT, EST, LEVL III, 20-29 MIN: ICD-10-PCS | Mod: S$PBB,25,TH, | Performed by: OBSTETRICS & GYNECOLOGY

## 2022-10-26 PROCEDURE — 99213 OFFICE O/P EST LOW 20 MIN: CPT | Mod: S$PBB,25,TH, | Performed by: OBSTETRICS & GYNECOLOGY

## 2022-10-26 PROCEDURE — 17250 PR CHEM CAUTERY GRANULATN TISSUE: ICD-10-PCS | Mod: S$PBB,58,, | Performed by: OBSTETRICS & GYNECOLOGY

## 2022-10-26 RX ORDER — DROSPIRENONE AND ETHINYL ESTRADIOL 0.02-3(28)
1 KIT ORAL DAILY
Qty: 28 TABLET | Refills: 11 | Status: SHIPPED | OUTPATIENT
Start: 2022-10-26

## 2022-10-26 NOTE — PROGRESS NOTES
"22 y.o. here for 6 week postpartum exam without complaint.   She is s/p  22  3rd degree laceration noted and repaired in normal fashion with 3-0 chromic on SH and 3-0 vicryl on CT .   She has no mood complaints.   Minimal lochia.   No pain.  Bottle feeding  Contraception: Pill  Pap: 2022 ASCUS & HPV, repeat one year    PE:  BP 98/64   Ht 5' 1" (1.549 m)   Wt 57.7 kg (127 lb 3.3 oz)   LMP 2021 (Approximate)   BMI 24.04 kg/m²   Body mass index is 24.04 kg/m².  Constitutional: She appears well-developed and well-nourished. No distress.   Breasts: deferred  Abdominal: Soft. She exhibits no distension and no mass. There is no tenderness. There is no rebound and no guarding.   Genitourinary: Physical Exam  Genitourinary:      Right Labia: No tenderness or lesions.     Left Labia: No tenderness or lesions.     No vaginal discharge, tenderness or bleeding.      No vaginal prolapse present.     No vaginal atrophy present.       Right Adnexa: not tender and no mass present.     Left Adnexa: not tender and no mass present.     No cervical motion tenderness or discharge.      Uterus is not tender.   OB laceration with Granulation tissue, cuaterized  Psychiatric: She has a normal mood and affect.    Assessment:  6 week interval PP exam, doing well  Postpartum exam    Granulation tissue at obstetrical laceration site    Encounter for female birth control  -     drospirenone-ethinyl estradioL (ARLEY) 3-0.02 mg per tablet; Take 1 tablet by mouth once daily.  Dispense: 28 tablet; Refill: 11      "

## 2023-09-06 ENCOUNTER — TELEPHONE (OUTPATIENT)
Dept: OBSTETRICS AND GYNECOLOGY | Facility: CLINIC | Age: 23
End: 2023-09-06
Payer: MEDICAID

## 2023-09-06 NOTE — TELEPHONE ENCOUNTER
Spoke with patient notified that no openings this month that she can schedule next available and be added to a wait list she refused. Notified that she can try another provider in the office patient stated thanks and hung up the phone

## 2023-09-06 NOTE — TELEPHONE ENCOUNTER
----- Message from Agapito Fletcher sent at 9/6/2023  1:41 PM CDT -----  Type:  Sooner Apoointment Request    Caller is requesting a sooner appointment.  Caller declined first available appointment listed below.  Caller will not accept being placed on the waitlist and is requesting a message be sent to doctor.  Name of Caller:pt  When is the first available appointment?12/30  Symptoms:Annual well visit  Would the patient rather a call back or a response via [facility]? Call  Best Call Back Number:[phone]  Additional Information: pt insurance goes out of effect at the end of the month, the pt would like to be seen as soon as possible

## 2023-10-19 ENCOUNTER — PATIENT MESSAGE (OUTPATIENT)
Dept: OBSTETRICS AND GYNECOLOGY | Facility: CLINIC | Age: 23
End: 2023-10-19
Payer: COMMERCIAL

## 2023-10-20 ENCOUNTER — PATIENT MESSAGE (OUTPATIENT)
Dept: OBSTETRICS AND GYNECOLOGY | Facility: CLINIC | Age: 23
End: 2023-10-20
Payer: COMMERCIAL

## 2023-12-28 ENCOUNTER — OFFICE VISIT (OUTPATIENT)
Dept: OBSTETRICS AND GYNECOLOGY | Facility: CLINIC | Age: 23
End: 2023-12-28
Payer: COMMERCIAL

## 2023-12-28 VITALS — WEIGHT: 122.38 LBS | DIASTOLIC BLOOD PRESSURE: 68 MMHG | BODY MASS INDEX: 23.12 KG/M2 | SYSTOLIC BLOOD PRESSURE: 96 MMHG

## 2023-12-28 DIAGNOSIS — Z12.4 CERVICAL CANCER SCREENING: ICD-10-CM

## 2023-12-28 DIAGNOSIS — Z01.419 ENCOUNTER FOR GYNECOLOGICAL EXAMINATION (GENERAL) (ROUTINE) WITHOUT ABNORMAL FINDINGS: Primary | ICD-10-CM

## 2023-12-28 PROCEDURE — 3008F PR BODY MASS INDEX (BMI) DOCUMENTED: ICD-10-PCS | Mod: CPTII,S$GLB,, | Performed by: OBSTETRICS & GYNECOLOGY

## 2023-12-28 PROCEDURE — 88175 CYTOPATH C/V AUTO FLUID REDO: CPT | Performed by: OBSTETRICS & GYNECOLOGY

## 2023-12-28 PROCEDURE — 3074F SYST BP LT 130 MM HG: CPT | Mod: CPTII,S$GLB,, | Performed by: OBSTETRICS & GYNECOLOGY

## 2023-12-28 PROCEDURE — 3078F PR MOST RECENT DIASTOLIC BLOOD PRESSURE < 80 MM HG: ICD-10-PCS | Mod: CPTII,S$GLB,, | Performed by: OBSTETRICS & GYNECOLOGY

## 2023-12-28 PROCEDURE — 99999 PR PBB SHADOW E&M-EST. PATIENT-LVL III: ICD-10-PCS | Mod: PBBFAC,,, | Performed by: OBSTETRICS & GYNECOLOGY

## 2023-12-28 PROCEDURE — 87624 HPV HI-RISK TYP POOLED RSLT: CPT | Performed by: OBSTETRICS & GYNECOLOGY

## 2023-12-28 PROCEDURE — 99999 PR PBB SHADOW E&M-EST. PATIENT-LVL III: CPT | Mod: PBBFAC,,, | Performed by: OBSTETRICS & GYNECOLOGY

## 2023-12-28 PROCEDURE — 1159F MED LIST DOCD IN RCRD: CPT | Mod: CPTII,S$GLB,, | Performed by: OBSTETRICS & GYNECOLOGY

## 2023-12-28 PROCEDURE — 99395 PREV VISIT EST AGE 18-39: CPT | Mod: S$GLB,,, | Performed by: OBSTETRICS & GYNECOLOGY

## 2023-12-28 PROCEDURE — 3078F DIAST BP <80 MM HG: CPT | Mod: CPTII,S$GLB,, | Performed by: OBSTETRICS & GYNECOLOGY

## 2023-12-28 PROCEDURE — 1159F PR MEDICATION LIST DOCUMENTED IN MEDICAL RECORD: ICD-10-PCS | Mod: CPTII,S$GLB,, | Performed by: OBSTETRICS & GYNECOLOGY

## 2023-12-28 PROCEDURE — 3008F BODY MASS INDEX DOCD: CPT | Mod: CPTII,S$GLB,, | Performed by: OBSTETRICS & GYNECOLOGY

## 2023-12-28 PROCEDURE — 99395 PR PREVENTIVE VISIT,EST,18-39: ICD-10-PCS | Mod: S$GLB,,, | Performed by: OBSTETRICS & GYNECOLOGY

## 2023-12-28 PROCEDURE — 3074F PR MOST RECENT SYSTOLIC BLOOD PRESSURE < 130 MM HG: ICD-10-PCS | Mod: CPTII,S$GLB,, | Performed by: OBSTETRICS & GYNECOLOGY

## 2023-12-28 NOTE — PROGRESS NOTES
Chief Complaint   Patient presents with    Annual Exam       History and Physical:  Nany Travis is a 23 y.o. F who presents today for her routine annual GYN exam. The patient has no Gynecology complaints.    Annual:   Patient's last menstrual period was 2023 (exact date).  Menarche: 11  Menstrual cycle: monthly, few days, light, no pain  Contraception: none, declines, did not like the pill  Pap: 2022 ASCUS & HPV, repeat one year 2023  History of abnormal pap: yes, ASCUS & HPV  STD testing: declines  Immunization status: up to date and documented. Status post HPV    Allergies: Review of patient's allergies indicates:  No Known Allergies  Past Medical History:   Diagnosis Date    Anemia 2022    with pregnancy     Past Surgical History:   Procedure Laterality Date    TYMPANOSTOMY TUBE PLACEMENT Bilateral     WISDOM TOOTH EXTRACTION Bilateral      MEDS:   Current Outpatient Medications on File Prior to Visit   Medication Sig Dispense Refill    drospirenone-ethinyl estradioL (ARLEY) 3-0.02 mg per tablet Take 1 tablet by mouth once daily. (Patient not taking: Reported on 2023) 28 tablet 11    ferrous sulfate 324 mg (65 mg iron) TbEC Take 1 tablet (324 mg total) by mouth once daily. (Patient not taking: Reported on 10/26/2022) 30 tablet 11    ibuprofen (ADVIL,MOTRIN) 600 MG tablet Take 1 tablet (600 mg total) by mouth every 6 (six) hours. (Patient not taking: Reported on 10/26/2022) 30 tablet 0    oxyCODONE-acetaminophen (PERCOCET) 5-325 mg per tablet Take 1 tablet by mouth every 4 (four) hours as needed. (Patient not taking: Reported on 10/26/2022) 20 tablet 0    prenatal vits62/FA/om3/dha/epa (PRENATAL GUMMY ORAL) Take 1 tablet by mouth Daily.       No current facility-administered medications on file prior to visit.     OB History          1    Para   1    Term   1            AB        Living   1         SAB        IAB        Ectopic        Multiple   0    Live Births   1                Social History     Socioeconomic History    Marital status: Single   Tobacco Use    Smoking status: Never     Passive exposure: Never    Smokeless tobacco: Never   Substance and Sexual Activity    Alcohol use: Not Currently    Drug use: Never    Sexual activity: Yes     Partners: Male     Birth control/protection: None     Family History   Problem Relation Age of Onset    Cancer Maternal Grandfather     Diabetes Maternal Grandfather     Breast cancer Neg Hx     Colon cancer Neg Hx     Ovarian cancer Neg Hx        Past medical and surgical history reviewed.   I have reviewed the patient's medical history in detail and updated the computerized patient record.    Review of System:   General: no chills, fever, night sweats, weight gain or weight loss  Breasts: no new or changing breast lumps, nipple discharge or masses.  Gastrointestinal: no abdominal pain, change in bowel habits, or black or bloody stools  Genito-Urinary: no incontinence, urinary frequency/urgency or vulvar/vaginal symptoms, pelvic pain or abnormal vaginal bleeding.    Physical Exam:   BP 96/68   Wt 55.5 kg (122 lb 5.7 oz)   LMP 12/18/2023 (Exact Date)   Breastfeeding No   BMI 23.12 kg/m²   Body mass index is 23.12 kg/m².  Constitutional: She appears alert and responsive. She appears well-developed, well-groomed, and well-nourished. No distress.  Abdominal: Soft. She exhibits no distension, hernias or masses. There is no tenderness. No enlargement of liver edge or spleen.  There is no rebound and no guarding.   Genitourinary:    External rectal exam shows no thrombosed external hemorrhoids, no lesions.     Pelvic exam was performed with patient supine.   No labial fusion, and symmetrical.    There is no rash, lesion or injury on the right labia.   There is no rash, lesion or injury on the left labia.   No bleeding and no signs of injury around the vaginal introitus, urethral meatus is normal size and without prolapse or lesions, urethra well  supported. The cervix is visualized with no discharge, lesions or friability.   No vaginal discharge found.    No significant Cystocele, Enterocele or rectocele, and cervix and uterus well supported.   Bimanual exam:   The urethra is normal to palpation and there are no palpable vaginal wall masses.   Uterus is not deviated, not enlarged, not fixed, normal shape and not tender.   Cervix exhibits no motion tenderness.    Right adnexum displays no mass or nodularity and no tenderness.   Left adnexum displays no mass or nodularity and no tenderness.    Assessment/Plan:   Encounter for gynecological examination (general) (routine) without abnormal findings    Cervical cancer screening  -     Liquid-Based Pap Smear, Screening  -     HPV High Risk Genotypes, PCR        Follow up in 1 year.

## 2024-01-03 ENCOUNTER — PATIENT MESSAGE (OUTPATIENT)
Dept: OBSTETRICS AND GYNECOLOGY | Facility: CLINIC | Age: 24
End: 2024-01-03
Payer: COMMERCIAL

## 2024-01-06 LAB
HPV HR 12 DNA SPEC QL NAA+PROBE: NEGATIVE
HPV16 AG SPEC QL: NEGATIVE
HPV18 DNA SPEC QL NAA+PROBE: NEGATIVE

## 2024-01-09 LAB
FINAL PATHOLOGIC DIAGNOSIS: NORMAL
Lab: NORMAL

## 2024-05-27 ENCOUNTER — TELEPHONE (OUTPATIENT)
Dept: PODIATRY | Facility: CLINIC | Age: 24
End: 2024-05-27
Payer: COMMERCIAL

## 2024-05-27 NOTE — TELEPHONE ENCOUNTER
----- Message from Miko Carreno sent at 5/27/2024  5:08 PM CDT -----  Type:  Appointment Request         Name of Caller:pt  When is the first available appointment?No access  Symptoms: Wound cellulitis [L03.90]  Would the patient rather a call back or a response via [facility]? call  Best Call Back Number:[phone]   Additional Information: Pt has urgent referral in system. Please call pt with further info and assistance. Thank you.

## 2024-09-06 ENCOUNTER — PATIENT MESSAGE (OUTPATIENT)
Dept: OBSTETRICS AND GYNECOLOGY | Facility: CLINIC | Age: 24
End: 2024-09-06
Payer: COMMERCIAL

## 2024-11-19 ENCOUNTER — LAB VISIT (OUTPATIENT)
Dept: LAB | Facility: HOSPITAL | Age: 24
End: 2024-11-19
Attending: OBSTETRICS & GYNECOLOGY
Payer: COMMERCIAL

## 2024-11-19 ENCOUNTER — OFFICE VISIT (OUTPATIENT)
Dept: OBSTETRICS AND GYNECOLOGY | Facility: CLINIC | Age: 24
End: 2024-11-19
Payer: COMMERCIAL

## 2024-11-19 VITALS
DIASTOLIC BLOOD PRESSURE: 58 MMHG | BODY MASS INDEX: 23 KG/M2 | WEIGHT: 125 LBS | SYSTOLIC BLOOD PRESSURE: 112 MMHG | HEIGHT: 62 IN

## 2024-11-19 DIAGNOSIS — O21.9 NAUSEA/VOMITING IN PREGNANCY: ICD-10-CM

## 2024-11-19 DIAGNOSIS — Z3A.08 8 WEEKS GESTATION OF PREGNANCY: ICD-10-CM

## 2024-11-19 DIAGNOSIS — O99.019 ANTEPARTUM ANEMIA: ICD-10-CM

## 2024-11-19 DIAGNOSIS — Z34.00 SUPERVISION OF NORMAL FIRST PREGNANCY, ANTEPARTUM: ICD-10-CM

## 2024-11-19 DIAGNOSIS — Z32.01 ENCOUNTER FOR PREGNANCY TEST, RESULT POSITIVE: Primary | ICD-10-CM

## 2024-11-19 LAB
B-HCG UR QL: POSITIVE
BASOPHILS # BLD AUTO: 0.02 K/UL (ref 0–0.2)
BASOPHILS NFR BLD: 0.2 % (ref 0–1.9)
CTP QC/QA: YES
DIFFERENTIAL METHOD BLD: ABNORMAL
EOSINOPHIL # BLD AUTO: 0 K/UL (ref 0–0.5)
EOSINOPHIL NFR BLD: 0.3 % (ref 0–8)
ERYTHROCYTE [DISTWIDTH] IN BLOOD BY AUTOMATED COUNT: 12.4 % (ref 11.5–14.5)
FERRITIN SERPL-MCNC: 44 NG/ML (ref 20–300)
HBV SURFACE AG SERPL QL IA: NORMAL
HCT VFR BLD AUTO: 34.3 % (ref 37–48.5)
HCV AB SERPL QL IA: NORMAL
HGB BLD-MCNC: 11.7 G/DL (ref 12–16)
HIV 1+2 AB+HIV1 P24 AG SERPL QL IA: NORMAL
IMM GRANULOCYTES # BLD AUTO: 0.01 K/UL (ref 0–0.04)
IMM GRANULOCYTES NFR BLD AUTO: 0.1 % (ref 0–0.5)
IRON SERPL-MCNC: 79 UG/DL (ref 30–160)
LYMPHOCYTES # BLD AUTO: 2 K/UL (ref 1–4.8)
LYMPHOCYTES NFR BLD: 21.3 % (ref 18–48)
MCH RBC QN AUTO: 30.4 PG (ref 27–31)
MCHC RBC AUTO-ENTMCNC: 34.1 G/DL (ref 32–36)
MCV RBC AUTO: 89 FL (ref 82–98)
MONOCYTES # BLD AUTO: 0.7 K/UL (ref 0.3–1)
MONOCYTES NFR BLD: 7.1 % (ref 4–15)
NEUTROPHILS # BLD AUTO: 6.5 K/UL (ref 1.8–7.7)
NEUTROPHILS NFR BLD: 71 % (ref 38–73)
NRBC BLD-RTO: 0 /100 WBC
PLATELET # BLD AUTO: 314 K/UL (ref 150–450)
PMV BLD AUTO: 10.5 FL (ref 9.2–12.9)
RBC # BLD AUTO: 3.85 M/UL (ref 4–5.4)
SATURATED IRON: 21 % (ref 20–50)
TOTAL IRON BINDING CAPACITY: 369 UG/DL (ref 250–450)
TRANSFERRIN SERPL-MCNC: 249 MG/DL (ref 200–375)
TREPONEMA PALLIDUM IGG+IGM AB [PRESENCE] IN SERUM OR PLASMA BY IMMUNOASSAY: NONREACTIVE
WBC # BLD AUTO: 9.15 K/UL (ref 3.9–12.7)

## 2024-11-19 PROCEDURE — 87389 HIV-1 AG W/HIV-1&-2 AB AG IA: CPT | Performed by: OBSTETRICS & GYNECOLOGY

## 2024-11-19 PROCEDURE — 86901 BLOOD TYPING SEROLOGIC RH(D): CPT | Performed by: OBSTETRICS & GYNECOLOGY

## 2024-11-19 PROCEDURE — 85025 COMPLETE CBC W/AUTO DIFF WBC: CPT | Performed by: OBSTETRICS & GYNECOLOGY

## 2024-11-19 PROCEDURE — 86787 VARICELLA-ZOSTER ANTIBODY: CPT | Performed by: OBSTETRICS & GYNECOLOGY

## 2024-11-19 PROCEDURE — 86803 HEPATITIS C AB TEST: CPT | Performed by: OBSTETRICS & GYNECOLOGY

## 2024-11-19 PROCEDURE — 87340 HEPATITIS B SURFACE AG IA: CPT | Performed by: OBSTETRICS & GYNECOLOGY

## 2024-11-19 PROCEDURE — 87086 URINE CULTURE/COLONY COUNT: CPT | Performed by: OBSTETRICS & GYNECOLOGY

## 2024-11-19 PROCEDURE — 87088 URINE BACTERIA CULTURE: CPT | Performed by: OBSTETRICS & GYNECOLOGY

## 2024-11-19 PROCEDURE — 87661 TRICHOMONAS VAGINALIS AMPLIF: CPT | Performed by: OBSTETRICS & GYNECOLOGY

## 2024-11-19 PROCEDURE — 83540 ASSAY OF IRON: CPT | Performed by: OBSTETRICS & GYNECOLOGY

## 2024-11-19 PROCEDURE — 36415 COLL VENOUS BLD VENIPUNCTURE: CPT | Mod: PN | Performed by: OBSTETRICS & GYNECOLOGY

## 2024-11-19 PROCEDURE — 82728 ASSAY OF FERRITIN: CPT | Performed by: OBSTETRICS & GYNECOLOGY

## 2024-11-19 PROCEDURE — 86762 RUBELLA ANTIBODY: CPT | Performed by: OBSTETRICS & GYNECOLOGY

## 2024-11-19 PROCEDURE — 87186 SC STD MICRODIL/AGAR DIL: CPT | Performed by: OBSTETRICS & GYNECOLOGY

## 2024-11-19 PROCEDURE — 87077 CULTURE AEROBIC IDENTIFY: CPT | Performed by: OBSTETRICS & GYNECOLOGY

## 2024-11-19 PROCEDURE — 99999 PR PBB SHADOW E&M-EST. PATIENT-LVL III: CPT | Mod: PBBFAC,,, | Performed by: OBSTETRICS & GYNECOLOGY

## 2024-11-19 PROCEDURE — 87491 CHLMYD TRACH DNA AMP PROBE: CPT | Performed by: OBSTETRICS & GYNECOLOGY

## 2024-11-19 PROCEDURE — 86593 SYPHILIS TEST NON-TREP QUANT: CPT | Performed by: OBSTETRICS & GYNECOLOGY

## 2024-11-19 RX ORDER — PYRIDOXINE HCL (VITAMIN B6) 25 MG
25 TABLET ORAL 3 TIMES DAILY
Qty: 90 TABLET | Refills: 1 | Status: SHIPPED | OUTPATIENT
Start: 2024-11-19 | End: 2024-12-19

## 2024-11-19 RX ORDER — PROMETHAZINE HYDROCHLORIDE 25 MG/1
25 TABLET ORAL EVERY 6 HOURS PRN
Qty: 30 TABLET | Refills: 1 | Status: SHIPPED | OUTPATIENT
Start: 2024-11-19

## 2024-11-19 NOTE — PROCEDURES
Procedures    ULTRASOUND:   Bedside Ultrasound Findings    EXAMINATION:  US PELVIS COMP WITH TRANSVAG OB    CLINICAL HISTORY:  UPT positive    TECHNIQUE:  Transvaginal sonography    COMPARISON:  None    FINDINGS:  1. Uterus:    Appearance: Viable ibarra intrauterine pregnancy was seen, yolk sac was seen. Crown-rump length = 17.4 mm with flicker, consistent with 8.1  and EDC 6/30/2025.        Impression      1. Single viable intrauterine pregnancy   2. Crown rump length consistent with 8.1 weeks gestational age , and estimated due date 6/30/2025     Final EDC: 6/30/2025 by last menstrual period consistent with 8wk US

## 2024-11-19 NOTE — PROGRESS NOTES
"Subjective:      Nany Travis is being seen today for her first obstetrical visit.  She is at Unknown gestation.     Patient's last menstrual period was 2024 (exact date).   8.1 by her LMP.      Menstrual History:  OB History          2    Para   1    Term   1            AB        Living   1         SAB        IAB        Ectopic        Multiple   0    Live Births   1                  The following portions of the patient's history were reviewed and updated as appropriate: allergies, current medications, past family history, past medical history, past social history, past surgical history, and problem list.     Review of Systems  Constitutional: negative for chills and fatigue  Gastrointestinal: negative for abdominal pain, constipation, diarrhea, nausea and vomiting  Genitourinary:negative for abnormal menstrual periods, genital lesions and vaginal discharge, dysuria, frequency and hematuria     Objective:      BP (!) 112/58   Ht 5' 2" (1.575 m)   Wt 56.7 kg (125 lb)   LMP 2024 (Exact Date)   BMI 22.86 kg/m²   General appearance: alert, appears stated age, and cooperative      ULTRASOUND:   Bedside Ultrasound Findings    EXAMINATION:  US PELVIS COMP WITH TRANSVAG OB    CLINICAL HISTORY:  UPT positive    TECHNIQUE:  Transvaginal sonography    COMPARISON:  None    FINDINGS:  1. Uterus:    Appearance: Viable ibarra intrauterine pregnancy was seen, yolk sac was seen. Crown-rump length = 17.4 mm with flicker, consistent with 8.1  and EDC 2025.        Impression      1. Single viable intrauterine pregnancy   2. Crown rump length consistent with 8.1 weeks gestational age , and estimated due date 2025     Final EDC: 2025 by last menstrual period consistent with 8wk US    Assessment:      Pregnancy at Unknown       Plan:      Initial labs drawn.  Prenatal vitamins.  Problem list reviewed and updated.      Spontaneous vaginal delivery 2022  History of 3rd degree tear. "   12/2023 NILM/ HPVneg    Nausea/Vomiting:   Small meals, bland diet, avoid over eating, avoid empty stomach.   BRAT diet: bananas, rice, apple sauce, & toast.   Convert prenatal vitamins to folic acid supplement only  Ginger Capsule 250mg 4x daily  Vit B6 (over the counter) 25mg oral 3-4x daily  Doxylamine (Unisom) 12.5mg oral 3-4x daily  Dramamine or Benadryl 25mg q4-6hr oral PRN   Promethazine (Phenergan) 25mg q4-6hr orally, rectally, IM, or IV    Possible pregnancy  -     POCT urine pregnancy    Antepartum anemia  -     Iron and TIBC; Future; Expected date: 11/19/2024  -     Ferritin; Future; Expected date: 11/19/2024  -     CBC Auto Differential; Future; Expected date: 11/19/2024    Supervision of normal first pregnancy, antepartum  -     Trichomonas vaginalis, RNA, Qual, Urine  -     Urine culture  -     C. trachomatis/N. gonorrhoeae by AMP DNA [facility]; Urine  -     Varicella Zoster Antibody, IgG; Future; Expected date: 11/19/2024  -     Type & Screen - Ob Profile; Future; Expected date: 11/19/2024  -     Rubella Antibody, IgG; Future; Expected date: 11/19/2024  -     HIV 1/2 Ag/Ab (4th Gen); Future; Expected date: 11/19/2024  -     Hepatitis C Antibody; Future; Expected date: 11/19/2024  -     Hepatitis B Surface Antigen; Future; Expected date: 11/19/2024  -     Treponema Pallidium Antibodies IgG, IgM; Future; Expected date: 11/19/2024  -     CBC Auto Differential; Future; Expected date: 11/19/2024    Nausea/vomiting in pregnancy  -     doxylamine succinate (UNISOM, DOXYLAMINE,) 25 mg tablet; Take 1/2 tablet three times a day to prevent nausea/vomitting in pregnancy  -     pyridoxine, vitamin B6, (B-6) 25 MG Tab; Take 1 tablet (25 mg total) by mouth 3 (three) times daily.  Dispense: 90 tablet; Refill: 1  -     promethazine (PHENERGAN) 25 MG tablet; Take 1 tablet (25 mg total) by mouth every 6 (six) hours as needed for Nausea.  Dispense: 30 tablet; Refill: 1        Follow up in 4 weeks.    I reviewed today her  blood pressure, weight gain, urine results and  fetal heart rate.  I have reviewed the previous labs and ultrasound with the patient.   I have answered questions to her satisfaction and total of 20 minutes spend today

## 2024-11-19 NOTE — PATIENT INSTRUCTIONS
Nausea/Vomiting:   Small meals, bland diet, avoid over eating, avoid empty stomach.   BRAT diet: bananas, rice, apple sauce, & toast.   Convert prenatal vitamins to folic acid supplement only  Donna Capsule 250mg 4x daily  Vit B6 (over the counter) 25mg oral 3-4x daily  Doxylamine (Unisom) 12.5mg oral 3-4x daily  Dramamine or Benadryl 25mg q4-6hr oral PRN   Promethazine (Phenergan) 25mg q4-6hr orally, rectally, IM, or IV

## 2024-11-20 LAB
ABO + RH BLD: NORMAL
BLD GP AB SCN CELLS X3 SERPL QL: NORMAL
C TRACH DNA SPEC QL NAA+PROBE: NOT DETECTED
N GONORRHOEA DNA SPEC QL NAA+PROBE: NOT DETECTED
RUBV IGG SER-ACNC: 12.3 IU/ML
RUBV IGG SER-IMP: REACTIVE
VARICELLA INTERPRETATION: NEGATIVE
VARICELLA ZOSTER IGG: 0.2 S/CO

## 2024-11-21 LAB
SPECIMEN SOURCE: NORMAL
T VAGINALIS RRNA SPEC QL NAA+PROBE: NEGATIVE

## 2024-11-22 ENCOUNTER — TELEPHONE (OUTPATIENT)
Dept: OBSTETRICS AND GYNECOLOGY | Facility: CLINIC | Age: 24
End: 2024-11-22
Payer: COMMERCIAL

## 2024-11-22 DIAGNOSIS — O23.40 URINARY TRACT INFECTION IN MOTHER DURING PREGNANCY, ANTEPARTUM: Primary | ICD-10-CM

## 2024-11-22 LAB
BACTERIA UR CULT: ABNORMAL
BACTERIA UR CULT: ABNORMAL

## 2024-11-22 RX ORDER — NITROFURANTOIN 25; 75 MG/1; MG/1
100 CAPSULE ORAL 2 TIMES DAILY
Qty: 10 CAPSULE | Refills: 0 | Status: SHIPPED | OUTPATIENT
Start: 2024-11-22 | End: 2024-11-27

## 2024-11-22 NOTE — TELEPHONE ENCOUNTER
----- Message from Veronica Fletcher MD sent at 11/22/2024  7:30 AM CST -----  Your urinalysis is consistent with a urinary tract infection.  I have called in a prescription for an antibiotic, nitrofurantoin, to assist in eliminating this infection.   If you have any further questions or concerns, please do not hesitate to contact me.      Veronica Fletcher MD

## 2024-12-12 ENCOUNTER — ROUTINE PRENATAL (OUTPATIENT)
Dept: OBSTETRICS AND GYNECOLOGY | Facility: CLINIC | Age: 24
End: 2024-12-12
Payer: COMMERCIAL

## 2024-12-12 VITALS — WEIGHT: 125.25 LBS | DIASTOLIC BLOOD PRESSURE: 66 MMHG | BODY MASS INDEX: 22.9 KG/M2 | SYSTOLIC BLOOD PRESSURE: 102 MMHG

## 2024-12-12 DIAGNOSIS — Z34.00 SUPERVISION OF NORMAL FIRST PREGNANCY, ANTEPARTUM: Primary | ICD-10-CM

## 2024-12-12 DIAGNOSIS — Z3A.11 11 WEEKS GESTATION OF PREGNANCY: ICD-10-CM

## 2024-12-12 LAB
BILIRUBIN, UA POC OHS: NEGATIVE
BLOOD, UA POC OHS: NEGATIVE
CLARITY, UA POC OHS: CLEAR
COLOR, UA POC OHS: YELLOW
GLUCOSE, UA POC OHS: NEGATIVE
KETONES, UA POC OHS: NEGATIVE
LEUKOCYTES, UA POC OHS: NEGATIVE
NITRITE, UA POC OHS: NEGATIVE
PH, UA POC OHS: 6
PROTEIN, UA POC OHS: NEGATIVE
SPECIFIC GRAVITY, UA POC OHS: 1.02
UROBILINOGEN, UA POC OHS: 0.2

## 2024-12-12 PROCEDURE — 99999 PR PBB SHADOW E&M-EST. PATIENT-LVL III: CPT | Mod: PBBFAC,,, | Performed by: OBSTETRICS & GYNECOLOGY

## 2024-12-12 NOTE — PROGRESS NOTES
Subjective:      Nayn Travis is a 24 y.o. female being seen today for her obstetrical visit. She is at 11w3d gestation. Patient reports no complaints.     OB ROS: no vaginal bleeding, no leakage of fluid, no contractions/cramping, no vaginal discharge. Fetal movement: too early.    Menstrual History:  OB History          2    Para   1    Term   1            AB        Living   1         SAB        IAB        Ectopic        Multiple   0    Live Births   1                Patient's last menstrual period was 2024 (exact date).       Objective:      /66   Wt 56.8 kg (125 lb 3.5 oz)   LMP 2024 (Exact Date)   BMI 22.90 kg/m²     Vitals  BP: 102/66  Weight: 56.8 kg (125 lb 3.5 oz)  Prenatal  Movement: Absent  Fundal height not measured  0.1 kg (3.5 oz)           Prenatal Labs:  Lab Results   Component Value Date    GROUPTRH A POS 2024    INDCOGEL NEG 2022    HGB 11.7 (L) 2024    HCT 34.3 (L) 2024     2024    RUBELLAIMMUN Reactive 2024    HEPBSAG Non-reactive 2024    [id number] Non-reactive 2024    RPR Non-reactive 2022    LABCHLA Not Detected 2024    LABNGO Not Detected 2024    LABURIN ENTEROCOCCUS FAECALIS  10,000 - 49,999 cfu/ml   (A) 2024    LABURIN (A) 2024     COAGULASE-NEGATIVE STAPHYLOCOCCUS SPECIES  10,000 - 49,999 cfu/ml  Susceptibility testing not routinely performed.      OBGLUCOSESCR 106 2022    STREPBCULT No Group B Streptococcus isolated 08/15/2022    [id number] Negative 2022       Assessment:      Pregnancy 11w3d      Plan:     Final EDC: 2025 by last menstrual period consistent with 8wk ultrasound  BMI 22  Genetic: desires  Spontaneous vaginal delivery 2022  History of 3rd degree tear.   2023 NILM/ HPV neg  Nausea and vomiting: prescription sent  History of Anemia: , MCV 89, ferritin 44  UTIx1: treated 2024        1. Supervision of normal first  pregnancy, antepartum  -     Connected MOM Enrollment  -     Assign Connected MOM Program Consent Questionnaire    2. 11 weeks gestation of pregnancy         Follow up in 4 weeks.       I reviewed today her blood pressure, weight gain, urine results and  fetal heart rate.  I have reviewed the previous labs and ultrasound with the patient.   I have answered questions to her satisfaction and total of 20 minutes spend today

## 2024-12-15 ENCOUNTER — PATIENT MESSAGE (OUTPATIENT)
Dept: OBSTETRICS AND GYNECOLOGY | Facility: CLINIC | Age: 24
End: 2024-12-15
Payer: COMMERCIAL

## 2024-12-15 DIAGNOSIS — O21.9 NAUSEA/VOMITING IN PREGNANCY: Primary | ICD-10-CM

## 2024-12-16 RX ORDER — ONDANSETRON 4 MG/1
4 TABLET, ORALLY DISINTEGRATING ORAL EVERY 6 HOURS PRN
Qty: 30 TABLET | Refills: 1 | Status: SHIPPED | OUTPATIENT
Start: 2024-12-16

## 2024-12-28 DIAGNOSIS — O21.9 NAUSEA/VOMITING IN PREGNANCY: ICD-10-CM

## 2024-12-30 NOTE — TELEPHONE ENCOUNTER
Refill Routing Note   Medication(s) are not appropriate for processing by [facility] for the following reason(s):        Outside of protocol    [facility] action(s):  Route               Appointments  past 12m or future 3m with PCP    Date Provider   Last Visit   12/12/2024 Veronica Fletcher MD   Next Visit   1/14/2025 Veronica Fletcher MD   ED visits in past 90 days: 0        Note composed:7:45 AM 12/30/2024

## 2025-01-02 RX ORDER — ONDANSETRON 4 MG/1
4 TABLET, ORALLY DISINTEGRATING ORAL EVERY 6 HOURS PRN
Qty: 30 TABLET | Refills: 1 | Status: SHIPPED | OUTPATIENT
Start: 2025-01-02

## 2025-01-13 ENCOUNTER — PATIENT MESSAGE (OUTPATIENT)
Dept: OTHER | Facility: OTHER | Age: 25
End: 2025-01-13
Payer: COMMERCIAL

## 2025-01-14 ENCOUNTER — ROUTINE PRENATAL (OUTPATIENT)
Dept: OBSTETRICS AND GYNECOLOGY | Facility: CLINIC | Age: 25
End: 2025-01-14
Payer: COMMERCIAL

## 2025-01-14 ENCOUNTER — LAB VISIT (OUTPATIENT)
Dept: LAB | Facility: HOSPITAL | Age: 25
End: 2025-01-14
Attending: OBSTETRICS & GYNECOLOGY
Payer: COMMERCIAL

## 2025-01-14 VITALS — SYSTOLIC BLOOD PRESSURE: 98 MMHG | DIASTOLIC BLOOD PRESSURE: 58 MMHG

## 2025-01-14 DIAGNOSIS — Z34.00 SUPERVISION OF NORMAL FIRST PREGNANCY, ANTEPARTUM: ICD-10-CM

## 2025-01-14 DIAGNOSIS — Z34.00 SUPERVISION OF NORMAL FIRST PREGNANCY, ANTEPARTUM: Primary | ICD-10-CM

## 2025-01-14 LAB
BILIRUBIN, UA POC OHS: NEGATIVE
BLOOD, UA POC OHS: NEGATIVE
CLARITY, UA POC OHS: CLEAR
COLOR, UA POC OHS: YELLOW
GLUCOSE, UA POC OHS: NEGATIVE
KETONES, UA POC OHS: NEGATIVE
LEUKOCYTES, UA POC OHS: NEGATIVE
NITRITE, UA POC OHS: NEGATIVE
PH, UA POC OHS: 7
PROTEIN, UA POC OHS: 30
SPECIFIC GRAVITY, UA POC OHS: >=1.03
UROBILINOGEN, UA POC OHS: 0.2

## 2025-01-14 PROCEDURE — 81511 FTL CGEN ABNOR FOUR ANAL: CPT | Performed by: OBSTETRICS & GYNECOLOGY

## 2025-01-14 PROCEDURE — 36415 COLL VENOUS BLD VENIPUNCTURE: CPT | Mod: PN | Performed by: OBSTETRICS & GYNECOLOGY

## 2025-01-14 PROCEDURE — 99999 PR PBB SHADOW E&M-EST. PATIENT-LVL II: CPT | Mod: PBBFAC,,, | Performed by: OBSTETRICS & GYNECOLOGY

## 2025-01-14 PROCEDURE — 0502F SUBSEQUENT PRENATAL CARE: CPT | Mod: CPTII,S$GLB,, | Performed by: OBSTETRICS & GYNECOLOGY

## 2025-01-14 NOTE — PROGRESS NOTES
Subjective:      Nany Travis is a 24 y.o. female being seen today for her obstetrical visit. She is at 16w1d gestation. Patient reports no complaints.     OB ROS: no vaginal bleeding, no leakage of fluid, no contractions/cramping, no vaginal discharge. Fetal movement: too early.    Menstrual History:  OB History          2    Para   1    Term   1            AB        Living   1         SAB        IAB        Ectopic        Multiple   0    Live Births   1                Patient's last menstrual period was 2024 (exact date).       Objective:      BP (!) 98/58   LMP 2024 (Exact Date)     Vitals  BP: (!) 98/58  Prenatal  Fetal Heart Rate: 141  Movement: Absent  Fundal height not measured  0.1 kg (3.5 oz)           Prenatal Labs:  Lab Results   Component Value Date    GROUPTRH A POS 2024    INDCOGEL NEG 2022    HGB 11.7 (L) 2025    HCT 34.1 (L) 2025     2025    RUBELLAIMMUN Reactive 2024    HEPBSAG Non-reactive 2024    [id number] Non-reactive 2024    RPR Non-reactive 2022    LABCHLA Not Detected 2024    LABNGO Not Detected 2024    LABURIN ENTEROCOCCUS FAECALIS  10,000 - 49,999 cfu/ml   (A) 2024    LABURIN (A) 2024     COAGULASE-NEGATIVE STAPHYLOCOCCUS SPECIES  10,000 - 49,999 cfu/ml  Susceptibility testing not routinely performed.      OBGLUCOSESCR 106 2022    STREPBCULT No Group B Streptococcus isolated 08/15/2022    [id number] Negative 2025       Assessment:      Pregnancy 16w1d      Plan:     Final EDC: 2025 by last menstrual period consistent with 8wk ultrasound  BMI 22  Genetic: Quad 2025  Vaccines: declined flu  Spontaneous vaginal delivery 2022  History of 3rd degree tear.   2023 NILM/ HPV neg  Nausea and vomiting: pr  escription sent  History of Anemia: , MCV 89, ferritin 44  UTIx1: treated 2024        1. Supervision of normal first pregnancy, antepartum  -      POCT Urinalysis(Instrument)  -     US OB 14+ Wks TransAbd & TransVag, Single Gestation (XPD); Future; Expected date: 01/14/2025         Follow up in 4 weeks.       I reviewed today her blood pressure, weight gain, urine results and  fetal heart rate.  I have reviewed the previous labs and ultrasound with the patient.   I have answered questions to her satisfaction and total of 20 minutes spend today

## 2025-01-17 LAB
# FETUSES US: NORMAL
2ND TRIMESTER 4 SCREEN PNL SERPL: NEGATIVE
2ND TRIMESTER 4 SCREEN SERPL-IMP: NORMAL
AFP MOM SERPL: 1.04
AFP SERPL-MCNC: 39.2 NG/ML
AGE AT DELIVERY: 25
B-HCG MOM SERPL: 1.28
B-HCG SERPL-ACNC: 47.7 IU/ML
FET TS 21 RISK FROM MAT AGE: NORMAL
GA (DAYS): 1 D
GA (WEEKS): 16 WK
GA METHOD: NORMAL
IDDM PATIENT QL: NORMAL
INHIBIN A MOM SERPL: 1.28
INHIBIN A SERPL-MCNC: 219.1 PG/ML
SMOKING STATUS FTND: NO
TS 18 RISK FETUS: NORMAL
TS 21 RISK FETUS: NORMAL
U ESTRIOL MOM SERPL: 1.01
U ESTRIOL SERPL-MCNC: 1.17 NG/ML

## 2025-01-20 ENCOUNTER — PATIENT MESSAGE (OUTPATIENT)
Dept: OTHER | Facility: OTHER | Age: 25
End: 2025-01-20
Payer: COMMERCIAL

## 2025-02-10 ENCOUNTER — PATIENT MESSAGE (OUTPATIENT)
Dept: OTHER | Facility: OTHER | Age: 25
End: 2025-02-10
Payer: COMMERCIAL

## 2025-02-10 ENCOUNTER — ROUTINE PRENATAL (OUTPATIENT)
Dept: OBSTETRICS AND GYNECOLOGY | Facility: CLINIC | Age: 25
End: 2025-02-10
Payer: COMMERCIAL

## 2025-02-10 ENCOUNTER — HOSPITAL ENCOUNTER (OUTPATIENT)
Dept: RADIOLOGY | Facility: HOSPITAL | Age: 25
Discharge: HOME | End: 2025-02-10
Attending: OBSTETRICS & GYNECOLOGY
Payer: COMMERCIAL

## 2025-02-10 VITALS
WEIGHT: 130.31 LBS | BODY MASS INDEX: 23.83 KG/M2 | DIASTOLIC BLOOD PRESSURE: 62 MMHG | SYSTOLIC BLOOD PRESSURE: 104 MMHG

## 2025-02-10 DIAGNOSIS — Z3A.20 20 WEEKS GESTATION OF PREGNANCY: ICD-10-CM

## 2025-02-10 DIAGNOSIS — Z34.80 ENCOUNTER FOR SUPERVISION OF OTHER NORMAL PREGNANCY, UNSPECIFIED TRIMESTER: Primary | ICD-10-CM

## 2025-02-10 DIAGNOSIS — O44.00 PLACENTA PREVIA ANTEPARTUM: ICD-10-CM

## 2025-02-10 DIAGNOSIS — Z34.00 SUPERVISION OF NORMAL FIRST PREGNANCY, ANTEPARTUM: ICD-10-CM

## 2025-02-10 LAB
BILIRUBIN, UA POC OHS: NEGATIVE
BLOOD, UA POC OHS: NEGATIVE
CLARITY, UA POC OHS: CLEAR
COLOR, UA POC OHS: YELLOW
GLUCOSE, UA POC OHS: NEGATIVE
KETONES, UA POC OHS: NEGATIVE
LEUKOCYTES, UA POC OHS: NEGATIVE
NITRITE, UA POC OHS: NEGATIVE
PH, UA POC OHS: 7
PROTEIN, UA POC OHS: NEGATIVE
SPECIFIC GRAVITY, UA POC OHS: 1.02
UROBILINOGEN, UA POC OHS: 0.2

## 2025-02-10 PROCEDURE — 76805 OB US >/= 14 WKS SNGL FETUS: CPT | Mod: TC,PN

## 2025-02-10 PROCEDURE — 76805 OB US >/= 14 WKS SNGL FETUS: CPT | Mod: 26,,, | Performed by: RADIOLOGY

## 2025-02-10 PROCEDURE — 0502F SUBSEQUENT PRENATAL CARE: CPT | Mod: CPTII,S$GLB,, | Performed by: OBSTETRICS & GYNECOLOGY

## 2025-02-10 PROCEDURE — 99999 PR PBB SHADOW E&M-EST. PATIENT-LVL III: CPT | Mod: PBBFAC,,, | Performed by: OBSTETRICS & GYNECOLOGY

## 2025-02-10 NOTE — PROGRESS NOTES
Subjective:      Nany Travis is a 24 y.o. female being seen today for her obstetrical visit. She is at 20w0d gestation. Patient reports no complaints.     OB ROS: no vaginal bleeding, no leakage of fluid, no contractions/cramping, no vaginal discharge. Fetal movement: normal.    Menstrual History:  OB History          2    Para   1    Term   1            AB        Living   1         SAB        IAB        Ectopic        Multiple   0    Live Births   1                Patient's last menstrual period was 2024 (exact date).       Objective:      /62   Wt 59.1 kg (130 lb 4.7 oz)   LMP 2024 (Exact Date)   BMI 23.83 kg/m²     Vitals  BP: 104/62  Weight: 59.1 kg (130 lb 4.7 oz)  Prenatal  Fetal Heart Rate: 134 U/S  Movement: Present  Fundal height not measured  2.4 kg (5 lb 4.7 oz)           Prenatal Labs:  Lab Results   Component Value Date    GROUPTRH A POS 2024    INDCOGEL NEG 2022    HGB 11.7 (L) 2025    HCT 34.1 (L) 2025     2025    RUBELLAIMMUN Reactive 2024    HEPBSAG Non-reactive 2024    [id number] Non-reactive 2024    RPR Non-reactive 2022    LABCHLA Not Detected 2024    LABNGO Not Detected 2024    LABURIN ENTEROCOCCUS FAECALIS  10,000 - 49,999 cfu/ml   (A) 2024    LABURIN (A) 2024     COAGULASE-NEGATIVE STAPHYLOCOCCUS SPECIES  10,000 - 49,999 cfu/ml  Susceptibility testing not routinely performed.      QUADSCREEN Negative 2025    OBGLUCOSESCR 106 2022    STREPBCULT No Group B Streptococcus isolated 08/15/2022    [id number] Negative 2025       Assessment:      Pregnancy 20w0d      Plan:     Final EDC: 2025 by last menstrual period consistent with 8wk ultrasound  BMI 22  Genetic: Quad Neg  Vaccines: declined flu  Spontaneous vaginal delivery 2022  History of 3rd degree tear.   2023 NILM/ HPV neg  Nausea and vomiting: prescription sent  History of Anemia: , MCV  89, ferritin 44  UTIx1: treated 11/22/2024  Previa: M referral        1. Encounter for supervision of other normal pregnancy, unspecified trimester  -     POCT Urinalysis(Instrument)  -     OB Glucose Screen; Future; Expected date: 02/10/2025    2. 20 weeks gestation of pregnancy    3. Placenta previa antepartum  -     Ambulatory referral/consult to Perinatology; Future; Expected date: 02/17/2025         Follow up in 4 weeks.       I reviewed today her blood pressure, weight gain, urine results and  fetal heart rate.  I have reviewed the previous labs and ultrasound with the patient.   I have answered questions to her satisfaction and total of 20 minutes spend today

## 2025-02-11 DIAGNOSIS — O44.00 PLACENTA PREVIA ANTEPARTUM: Primary | ICD-10-CM

## 2025-02-17 ENCOUNTER — OFFICE VISIT (OUTPATIENT)
Dept: MATERNAL FETAL MEDICINE | Facility: CLINIC | Age: 25
End: 2025-02-17
Payer: COMMERCIAL

## 2025-02-17 ENCOUNTER — PROCEDURE VISIT (OUTPATIENT)
Dept: MATERNAL FETAL MEDICINE | Facility: CLINIC | Age: 25
End: 2025-02-17
Payer: COMMERCIAL

## 2025-02-17 VITALS
DIASTOLIC BLOOD PRESSURE: 55 MMHG | WEIGHT: 132.25 LBS | SYSTOLIC BLOOD PRESSURE: 104 MMHG | HEART RATE: 80 BPM | HEIGHT: 62 IN | BODY MASS INDEX: 24.34 KG/M2

## 2025-02-17 DIAGNOSIS — O44.00 PLACENTA PREVIA ANTEPARTUM: ICD-10-CM

## 2025-02-17 PROCEDURE — 99213 OFFICE O/P EST LOW 20 MIN: CPT | Mod: S$GLB,,, | Performed by: STUDENT IN AN ORGANIZED HEALTH CARE EDUCATION/TRAINING PROGRAM

## 2025-02-17 NOTE — ASSESSMENT & PLAN NOTE
Patient presents for consultation secondary to placenta previa. On ultrasound today, low lying placenta diagnosed,measuring 3.4 mm from cervix. Given proximity to cervix, we reviewed previa precautions.    1-6% of patients on a routine ultrasound have a previa between 10-20 wks. 90% resolve upon imaging in 3rd trimester.  Risks factors for previa include endometrial scarring in the upper segment of the uterus, multiple prior uterine surgeries (D&C), smoking, IVF and multiple gestations. I reviewed bleeding and obstetric precautions including pelvic rest with the patient    Recommendations:  Re-evaluation by ultrasound at 32 weeks to evaluate low lying placenta  She was given bleeding precautions and pelvic rest recommendations   .

## 2025-02-17 NOTE — PROGRESS NOTES
"MATERNAL-FETAL MEDICINE   CONSULT NOTE    Provider requesting consultation: Dr. Fletcher  SUBJECTIVE:   Ms. Nany Travis is a 24 y.o.  female with IUP at 21w0d who is seen in consultation by MFM for evaluation and management of:  Problem   Low Lying Placenta Antepartum         Review of patient's allergies indicates:  No Known Allergies  OB History    Para Term  AB Living   2 1 1   1   SAB IAB Ectopic Multiple Live Births      0 1      # Outcome Date GA Lbr Morris/2nd Weight Sex Type Anes PTL Lv   2 Current            1 Term 22 40w2d / 00:29 3.161 kg (6 lb 15.5 oz) F Vag-Spont EPI N SINGH     Past Medical History:   Diagnosis Date    Anemia 2022    with pregnancy     Past Surgical History:   Procedure Laterality Date    TYMPANOSTOMY TUBE PLACEMENT Bilateral     WISDOM TOOTH EXTRACTION Bilateral      Family history: negative for birth defects, recurrent miscarriages, chromosomal abnormalities.   Social History[1]  Review of patient's allergies indicates:  No Known Allergies  Objective:   BP (!) 104/55   Pulse 80   Ht 5' 2" (1.575 m)   Wt 60 kg (132 lb 4.4 oz)   LMP 2024 (Exact Date)   BMI 24.19 kg/m²   Ultrasound performed. See viewpoint for full ultrasound report.  A ibarra living IUP is identified.   Fetal size is appropriate for established dating.   No fetal structural malformations are identified.   Cervical length by TA scanning is normal.  Placental location is posterior low lying measuring 3.7 mm from internal cervical os.  Significant labs/imaging:  Quad low risk  ASSESSMENT/PLAN:     24 y.o.  female with IUP at 21w0d     Placenta previa antepartum  Patient presents for consultation secondary to placenta previa. On ultrasound today, low lying placenta diagnosed,measuring 3.4 mm from cervix. Given proximity to cervix, we reviewed previa precautions.    1-6% of patients on a routine ultrasound have a previa between 10-20 wks. 90% resolve upon imaging in 3rd " trimester.  Risks factors for previa include endometrial scarring in the upper segment of the uterus, multiple prior uterine surgeries (D&C), smoking, IVF and multiple gestations. I reviewed bleeding and obstetric precautions including pelvic rest with the patient    Recommendations:  Re-evaluation by ultrasound at 32 weeks to evaluate low lying placenta  She was given bleeding precautions and pelvic rest recommendations   .           Bria Gillespie  Maternal-Fetal Medicine    Electronically Signed by Bria Gillespie February 17, 2025           [1]   Social History  Tobacco Use    Smoking status: Never     Passive exposure: Never    Smokeless tobacco: Never   Substance Use Topics    Alcohol use: Not Currently    Drug use: Never

## 2025-02-25 ENCOUNTER — PATIENT MESSAGE (OUTPATIENT)
Dept: OBSTETRICS AND GYNECOLOGY | Facility: CLINIC | Age: 25
End: 2025-02-25
Payer: COMMERCIAL

## 2025-02-25 DIAGNOSIS — B00.2 RECURRENT ORAL HERPES SIMPLEX: Primary | ICD-10-CM

## 2025-02-25 RX ORDER — VALACYCLOVIR HYDROCHLORIDE 500 MG/1
500 TABLET, FILM COATED ORAL DAILY
Qty: 30 TABLET | Refills: 11 | Status: SHIPPED | OUTPATIENT
Start: 2025-02-25 | End: 2026-02-25

## 2025-03-10 ENCOUNTER — PATIENT MESSAGE (OUTPATIENT)
Dept: OTHER | Facility: OTHER | Age: 25
End: 2025-03-10
Payer: COMMERCIAL

## 2025-03-11 ENCOUNTER — ROUTINE PRENATAL (OUTPATIENT)
Dept: OBSTETRICS AND GYNECOLOGY | Facility: CLINIC | Age: 25
End: 2025-03-11
Payer: COMMERCIAL

## 2025-03-11 ENCOUNTER — LAB VISIT (OUTPATIENT)
Dept: LAB | Facility: HOSPITAL | Age: 25
End: 2025-03-11
Attending: OBSTETRICS & GYNECOLOGY
Payer: COMMERCIAL

## 2025-03-11 ENCOUNTER — RESULTS FOLLOW-UP (OUTPATIENT)
Dept: OBSTETRICS AND GYNECOLOGY | Facility: CLINIC | Age: 25
End: 2025-03-11

## 2025-03-11 VITALS
BODY MASS INDEX: 25.12 KG/M2 | DIASTOLIC BLOOD PRESSURE: 64 MMHG | SYSTOLIC BLOOD PRESSURE: 106 MMHG | WEIGHT: 137.38 LBS

## 2025-03-11 DIAGNOSIS — O99.019 ANTEPARTUM ANEMIA: ICD-10-CM

## 2025-03-11 DIAGNOSIS — R42 DIZZINESS: ICD-10-CM

## 2025-03-11 DIAGNOSIS — O99.019 ANTEPARTUM ANEMIA: Primary | ICD-10-CM

## 2025-03-11 DIAGNOSIS — Z34.80 ENCOUNTER FOR SUPERVISION OF OTHER NORMAL PREGNANCY, UNSPECIFIED TRIMESTER: ICD-10-CM

## 2025-03-11 DIAGNOSIS — Z34.80 ENCOUNTER FOR SUPERVISION OF OTHER NORMAL PREGNANCY, UNSPECIFIED TRIMESTER: Primary | ICD-10-CM

## 2025-03-11 LAB
ANION GAP SERPL CALC-SCNC: 12 MMOL/L (ref 8–16)
BASOPHILS # BLD AUTO: 0.02 K/UL (ref 0–0.2)
BASOPHILS NFR BLD: 0.2 % (ref 0–1.9)
BILIRUBIN, UA POC OHS: NEGATIVE
BLOOD, UA POC OHS: NEGATIVE
BUN SERPL-MCNC: 5 MG/DL (ref 6–20)
CALCIUM SERPL-MCNC: 8.5 MG/DL (ref 8.7–10.5)
CHLORIDE SERPL-SCNC: 105 MMOL/L (ref 95–110)
CLARITY, UA POC OHS: ABNORMAL
CO2 SERPL-SCNC: 21 MMOL/L (ref 23–29)
COLOR, UA POC OHS: ABNORMAL
CREAT SERPL-MCNC: 0.6 MG/DL (ref 0.5–1.4)
DIFFERENTIAL METHOD BLD: ABNORMAL
EOSINOPHIL # BLD AUTO: 0 K/UL (ref 0–0.5)
EOSINOPHIL NFR BLD: 0.4 % (ref 0–8)
ERYTHROCYTE [DISTWIDTH] IN BLOOD BY AUTOMATED COUNT: 13.2 % (ref 11.5–14.5)
EST. GFR  (NO RACE VARIABLE): >60 ML/MIN/1.73 M^2
FERRITIN SERPL-MCNC: 7 NG/ML (ref 20–300)
GLUCOSE SERPL-MCNC: 104 MG/DL (ref 70–110)
GLUCOSE SERPL-MCNC: 130 MG/DL (ref 70–140)
GLUCOSE, UA POC OHS: NEGATIVE
HCT VFR BLD AUTO: 31.9 % (ref 37–48.5)
HGB BLD-MCNC: 10.5 G/DL (ref 12–16)
IMM GRANULOCYTES # BLD AUTO: 0.04 K/UL (ref 0–0.04)
IMM GRANULOCYTES NFR BLD AUTO: 0.4 % (ref 0–0.5)
KETONES, UA POC OHS: NEGATIVE
LEUKOCYTES, UA POC OHS: NEGATIVE
LYMPHOCYTES # BLD AUTO: 1.9 K/UL (ref 1–4.8)
LYMPHOCYTES NFR BLD: 21 % (ref 18–48)
MCH RBC QN AUTO: 31.3 PG (ref 27–31)
MCHC RBC AUTO-ENTMCNC: 32.9 G/DL (ref 32–36)
MCV RBC AUTO: 95 FL (ref 82–98)
MONOCYTES # BLD AUTO: 0.5 K/UL (ref 0.3–1)
MONOCYTES NFR BLD: 5.6 % (ref 4–15)
NEUTROPHILS # BLD AUTO: 6.6 K/UL (ref 1.8–7.7)
NEUTROPHILS NFR BLD: 72.4 % (ref 38–73)
NITRITE, UA POC OHS: NEGATIVE
NRBC BLD-RTO: 0 /100 WBC
PH, UA POC OHS: 8.5
PLATELET # BLD AUTO: 328 K/UL (ref 150–450)
PMV BLD AUTO: 10 FL (ref 9.2–12.9)
POTASSIUM SERPL-SCNC: 3.7 MMOL/L (ref 3.5–5.1)
PROTEIN, UA POC OHS: NEGATIVE
RBC # BLD AUTO: 3.36 M/UL (ref 4–5.4)
SODIUM SERPL-SCNC: 138 MMOL/L (ref 136–145)
SPECIFIC GRAVITY, UA POC OHS: 1.01
TSH SERPL DL<=0.005 MIU/L-ACNC: 0.43 UIU/ML (ref 0.4–4)
UROBILINOGEN, UA POC OHS: 0.2
WBC # BLD AUTO: 9.11 K/UL (ref 3.9–12.7)

## 2025-03-11 PROCEDURE — 36415 COLL VENOUS BLD VENIPUNCTURE: CPT | Mod: PO | Performed by: OBSTETRICS & GYNECOLOGY

## 2025-03-11 PROCEDURE — 82950 GLUCOSE TEST: CPT | Performed by: OBSTETRICS & GYNECOLOGY

## 2025-03-11 PROCEDURE — 85025 COMPLETE CBC W/AUTO DIFF WBC: CPT | Performed by: OBSTETRICS & GYNECOLOGY

## 2025-03-11 PROCEDURE — 80048 BASIC METABOLIC PNL TOTAL CA: CPT | Performed by: OBSTETRICS & GYNECOLOGY

## 2025-03-11 PROCEDURE — 99999 PR PBB SHADOW E&M-EST. PATIENT-LVL III: CPT | Mod: PBBFAC,,, | Performed by: OBSTETRICS & GYNECOLOGY

## 2025-03-11 PROCEDURE — 84443 ASSAY THYROID STIM HORMONE: CPT | Performed by: OBSTETRICS & GYNECOLOGY

## 2025-03-11 PROCEDURE — 0502F SUBSEQUENT PRENATAL CARE: CPT | Mod: CPTII,S$GLB,, | Performed by: OBSTETRICS & GYNECOLOGY

## 2025-03-11 PROCEDURE — 82728 ASSAY OF FERRITIN: CPT | Performed by: OBSTETRICS & GYNECOLOGY

## 2025-03-11 PROCEDURE — 36415 COLL VENOUS BLD VENIPUNCTURE: CPT | Mod: PN | Performed by: OBSTETRICS & GYNECOLOGY

## 2025-03-11 NOTE — PROGRESS NOTES
Subjective:      Nany Travis is a 24 y.o. female being seen today for her obstetrical visit. She is at 24w1d gestation. Patient reports  dizziness with exertion and hand swelling .     OB ROS: no vaginal bleeding, no leakage of fluid, no contractions/cramping, no vaginal discharge. Fetal movement: normal.    Menstrual History:  OB History          2    Para   1    Term   1            AB        Living   1         SAB        IAB        Ectopic        Multiple   0    Live Births   1                Patient's last menstrual period was 2024 (exact date).       Objective:      /64   Wt 62.3 kg (137 lb 5.6 oz)   LMP 2024 (Exact Date)   BMI 25.12 kg/m²     Vitals  BP: 106/64  Weight: 62.3 kg (137 lb 5.6 oz)  Prenatal  Fetal Heart Rate: 134  Movement: Present  Fundal height not measured  5.6 kg (12 lb 5.5 oz)           Prenatal Labs:  Lab Results   Component Value Date    GROUPTRH A POS 2024    INDCOGEL NEG 2022    HGB 11.7 (L) 2025    HCT 34.1 (L) 2025     2025    RUBELLAIMMUN Reactive 2024    HEPBSAG Non-reactive 2024    [id number] Non-reactive 2024    RPR Non-reactive 2022    LABCHLA Not Detected 2024    LABNGO Not Detected 2024    LABURIN ENTEROCOCCUS FAECALIS  10,000 - 49,999 cfu/ml   (A) 2024    LABURIN (A) 2024     COAGULASE-NEGATIVE STAPHYLOCOCCUS SPECIES  10,000 - 49,999 cfu/ml  Susceptibility testing not routinely performed.      QUADSCREEN Negative 2025    OBGLUCOSESCR 106 2022    STREPBCULT No Group B Streptococcus isolated 08/15/2022    [id number] Negative 2025       Assessment:      Pregnancy 24w1d      Plan:     Final EDC: 2025 by last menstrual period consistent with 8wk ultrasound  BMI 22  Genetic: Quad Neg  Vaccines: declined flu  Spontaneous vaginal delivery 2022  History of 3rd degree tear.   2023 NILM/ HPV neg  Nausea and vomiting: prescription  sent  History of Anemia: 11/34, MCV 89, ferritin 44  UTIx1: treated 11/22/2024  Previa: Resolved. Placental location is posterior low lying measuring 3.7 mm from internal cervical os.        1. Encounter for supervision of other normal pregnancy, unspecified trimester  -     POCT Urinalysis(Instrument)    2. Dizziness  -     TSH; Future; Expected date: 03/11/2025  -     Basic metabolic panel; Future; Expected date: 03/11/2025  -     CBC W/ AUTO DIFFERENTIAL; Future; Expected date: 03/11/2025    3. Antepartum anemia  -     CBC W/ AUTO DIFFERENTIAL; Future; Expected date: 03/11/2025  -     FERRITIN; Future; Expected date: 03/11/2025         Follow up in 4 weeks.       I reviewed today her blood pressure, weight gain, urine results and  fetal heart rate.  I have reviewed the previous labs and ultrasound with the patient.   I have answered questions to her satisfaction and total of 20 minutes spend today

## 2025-03-12 RX ORDER — FERROUS SULFATE 325(65) MG
325 TABLET, DELAYED RELEASE (ENTERIC COATED) ORAL DAILY
Qty: 30 TABLET | Refills: 11 | Status: SHIPPED | OUTPATIENT
Start: 2025-03-12 | End: 2026-03-12

## 2025-03-24 ENCOUNTER — PATIENT MESSAGE (OUTPATIENT)
Dept: OTHER | Facility: OTHER | Age: 25
End: 2025-03-24
Payer: COMMERCIAL

## 2025-04-01 ENCOUNTER — PATIENT MESSAGE (OUTPATIENT)
Dept: OBSTETRICS AND GYNECOLOGY | Facility: CLINIC | Age: 25
End: 2025-04-01
Payer: COMMERCIAL

## 2025-04-07 ENCOUNTER — RESULTS FOLLOW-UP (OUTPATIENT)
Dept: OBSTETRICS AND GYNECOLOGY | Facility: CLINIC | Age: 25
End: 2025-04-07

## 2025-04-07 ENCOUNTER — PATIENT MESSAGE (OUTPATIENT)
Dept: OTHER | Facility: OTHER | Age: 25
End: 2025-04-07
Payer: COMMERCIAL

## 2025-04-07 ENCOUNTER — ROUTINE PRENATAL (OUTPATIENT)
Dept: OBSTETRICS AND GYNECOLOGY | Facility: CLINIC | Age: 25
End: 2025-04-07
Payer: COMMERCIAL

## 2025-04-07 VITALS
DIASTOLIC BLOOD PRESSURE: 62 MMHG | SYSTOLIC BLOOD PRESSURE: 112 MMHG | WEIGHT: 142.19 LBS | BODY MASS INDEX: 26.01 KG/M2

## 2025-04-07 DIAGNOSIS — Z23 NEED FOR TDAP VACCINATION: ICD-10-CM

## 2025-04-07 DIAGNOSIS — Z34.80 ENCOUNTER FOR SUPERVISION OF OTHER NORMAL PREGNANCY, UNSPECIFIED TRIMESTER: Primary | ICD-10-CM

## 2025-04-07 LAB
BILIRUBIN, UA POC OHS: NEGATIVE
BLOOD, UA POC OHS: NEGATIVE
CLARITY, UA POC OHS: CLEAR
COLOR, UA POC OHS: ABNORMAL
GLUCOSE, UA POC OHS: 100
KETONES, UA POC OHS: NEGATIVE
LEUKOCYTES, UA POC OHS: NEGATIVE
NITRITE, UA POC OHS: NEGATIVE
PH, UA POC OHS: 7
PROTEIN, UA POC OHS: NEGATIVE
SPECIFIC GRAVITY, UA POC OHS: 1.02
UROBILINOGEN, UA POC OHS: 0.2

## 2025-04-07 PROCEDURE — 90715 TDAP VACCINE 7 YRS/> IM: CPT | Mod: S$GLB,,, | Performed by: OBSTETRICS & GYNECOLOGY

## 2025-04-07 PROCEDURE — 99999 PR PBB SHADOW E&M-EST. PATIENT-LVL III: CPT | Mod: PBBFAC,,, | Performed by: OBSTETRICS & GYNECOLOGY

## 2025-04-07 PROCEDURE — 0502F SUBSEQUENT PRENATAL CARE: CPT | Mod: CPTII,S$GLB,, | Performed by: OBSTETRICS & GYNECOLOGY

## 2025-04-07 PROCEDURE — 90471 IMMUNIZATION ADMIN: CPT | Mod: S$GLB,,, | Performed by: OBSTETRICS & GYNECOLOGY

## 2025-04-07 NOTE — PROGRESS NOTES
Subjective:      Nany Travis is a 24 y.o. female being seen today for her obstetrical visit. She is at 28w0d gestation. Patient reports  anxiety .     OB ROS: no vaginal bleeding, no leakage of fluid, no contractions/cramping, no vaginal discharge. Fetal movement: normal.    Menstrual History:  OB History          2    Para   1    Term   1            AB        Living   1         SAB        IAB        Ectopic        Multiple   0    Live Births   1                Patient's last menstrual period was 2024 (exact date).       Objective:      /62   Wt 64.5 kg (142 lb 3.2 oz)   LMP 2024 (Exact Date)   BMI 26.01 kg/m²     Vitals  BP: 112/62  Weight: 64.5 kg (142 lb 3.2 oz)  Prenatal  Fundal Height (cm): 27 cm  Fetal Heart Rate: 146  Movement: Present  Fundal height size equal to dates  7.8 kg (17 lb 3.1 oz)           Prenatal Labs:  Lab Results   Component Value Date    GROUPTRH A POS 2024    INDCOGEL NEG 2022    HGB 10.5 (L) 2025    HCT 31.9 (L) 2025     2025    RUBELLAIMMUN Reactive 2024    HEPBSAG Non-reactive 2024    [id number] Non-reactive 2024    RPR Non-reactive 2022    LABCHLA Not Detected 2024    LABNGO Not Detected 2024    LABURIN ENTEROCOCCUS FAECALIS  10,000 - 49,999 cfu/ml   (A) 2024    LABURIN (A) 2024     COAGULASE-NEGATIVE STAPHYLOCOCCUS SPECIES  10,000 - 49,999 cfu/ml  Susceptibility testing not routinely performed.      QUADSCREEN Negative 2025    OBGLUCOSESCR 130 2025    STREPBCULT No Group B Streptococcus isolated 08/15/2022    [id number] Negative 2025       Assessment:      Pregnancy 28w0d      Plan:     Final EDC: 2025 by last menstrual period consistent with 8wk ultrasound  BMI 22  Genetic: Quad Neg  Vaccines: declined flu  Spontaneous vaginal delivery 2022  History of 3rd degree tear. 2023 NILM/ HPV neg  Nausea and vomiting: prescription  sent  History of Anemia: 11/34, MCV 89, ferritin 44  UTIx1: treated 11/22/2024  Previa: Resolved. Placental location is posterior low lying measuring 3.7 mm from internal cervical os. Plan for spontaneous vaginal delivery.         1. Encounter for supervision of other normal pregnancy, unspecified trimester  -     POCT Urinalysis(Instrument)    2. Need for Tdap vaccination  -     Tdap (BOOSTRIX) vaccine injection 0.5 mL         Follow up in 2 weeks.       I reviewed today her blood pressure, weight gain, urine results and  fetal heart rate.  I have reviewed the previous labs and ultrasound with the patient.   I have answered questions to her satisfaction and total of 20 minutes spend today

## 2025-04-07 NOTE — Clinical Note
Please make all her OB FU visits until delivery. She will need a visit every two weeks until 36 weeks. Then a visit weekly until 40 weeks. She will need a growth US at 36 weeks.   Monday Mornings

## 2025-04-21 ENCOUNTER — PATIENT MESSAGE (OUTPATIENT)
Dept: OTHER | Facility: OTHER | Age: 25
End: 2025-04-21
Payer: COMMERCIAL

## 2025-04-21 ENCOUNTER — ROUTINE PRENATAL (OUTPATIENT)
Dept: OBSTETRICS AND GYNECOLOGY | Facility: CLINIC | Age: 25
End: 2025-04-21
Payer: COMMERCIAL

## 2025-04-21 VITALS — WEIGHT: 144.63 LBS | BODY MASS INDEX: 26.45 KG/M2 | DIASTOLIC BLOOD PRESSURE: 62 MMHG | SYSTOLIC BLOOD PRESSURE: 98 MMHG

## 2025-04-21 DIAGNOSIS — O99.019 ANTEPARTUM ANEMIA: ICD-10-CM

## 2025-04-21 DIAGNOSIS — Z34.80 ENCOUNTER FOR SUPERVISION OF OTHER NORMAL PREGNANCY, UNSPECIFIED TRIMESTER: Primary | ICD-10-CM

## 2025-04-21 DIAGNOSIS — O26.843 UTERINE SIZE-DATE DISCREPANCY IN THIRD TRIMESTER: ICD-10-CM

## 2025-04-21 PROCEDURE — 99999 PR PBB SHADOW E&M-EST. PATIENT-LVL III: CPT | Mod: PBBFAC,,, | Performed by: OBSTETRICS & GYNECOLOGY

## 2025-04-21 PROCEDURE — 0502F SUBSEQUENT PRENATAL CARE: CPT | Mod: CPTII,S$GLB,, | Performed by: OBSTETRICS & GYNECOLOGY

## 2025-04-21 NOTE — PROGRESS NOTES
Subjective:      Nany Travis is a 24 y.o. female being seen today for her obstetrical visit. She is at 30w0d gestation. Patient reports no complaints.     OB ROS: no vaginal bleeding, no leakage of fluid, no contractions/cramping, no vaginal discharge. Fetal movement: normal.    Menstrual History:  OB History          2    Para   1    Term   1            AB        Living   1         SAB        IAB        Ectopic        Multiple   0    Live Births   1                Patient's last menstrual period was 2024 (exact date).       Objective:      BP 98/62   Wt 65.6 kg (144 lb 10 oz)   LMP 2024 (Exact Date)   BMI 26.45 kg/m²     Vitals  BP: 98/62  Weight: 65.6 kg (144 lb 10 oz)  Prenatal  Fundal Height (cm): 27 cm  Fetal Heart Rate: 140  Movement: Present  Fundal height  size less than dates  8.9 kg (19 lb 9.9 oz)           Prenatal Labs:  Lab Results   Component Value Date    GROUPTRH A POS 2024    INDCOGEL NEG 2022    HGB 10.5 (L) 2025    HCT 31.9 (L) 2025     2025    RUBELLAIMMUN Reactive 2024    HEPBSAG Non-reactive 2024    [id number] Non-reactive 2024    RPR Non-reactive 2022    LABCHLA Not Detected 2024    LABNGO Not Detected 2024    LABURIN ENTEROCOCCUS FAECALIS  10,000 - 49,999 cfu/ml   (A) 2024    LABURIN (A) 2024     COAGULASE-NEGATIVE STAPHYLOCOCCUS SPECIES  10,000 - 49,999 cfu/ml  Susceptibility testing not routinely performed.      QUADSCREEN Negative 2025    OBGLUCOSESCR 130 2025    STREPBCULT No Group B Streptococcus isolated 08/15/2022    [id number] Negative 2025       Assessment:      Pregnancy 30w0d      Plan:     Final EDC: 2025 by last menstrual period consistent with 8wk ultrasound  BMI 22  Genetic: Quad Neg  Vaccines: declined flu, Tdap 2025  Spontaneous vaginal delivery 2022  History of 3rd degree tear.   2023 NILM/ HPV neg  Nausea and vomiting:  prescription sent  History of Anemia: 11/34, MCV 89, ferritin 44  UTIx1: treated 11/22/2024  Previa: Resolved. Placental location is posterior low lying measuring 3.7 mm from internal cervical os.  Peds: [facility]  Formula  Contraception: vasectomy  Epidual        1. Encounter for supervision of other normal pregnancy, unspecified trimester  -     POCT Urinalysis(Instrument)    2. Antepartum anemia  -     Iron and TIBC; Future; Expected date: 04/21/2025  -     Ferritin; Future; Expected date: 04/21/2025  -     CBC Auto Differential; Future; Expected date: 04/21/2025    3. Uterine size-date discrepancy in third trimester  -     US OB Limited 1 Or More Gestations; Future; Expected date: 05/21/2025         Follow up in 2 weeks.       I reviewed today her blood pressure, weight gain, urine results and  fetal heart rate.  I have reviewed the previous labs and ultrasound with the patient.   I have answered questions to her satisfaction and total of 20 minutes spend today

## 2025-04-22 ENCOUNTER — PATIENT MESSAGE (OUTPATIENT)
Dept: OBSTETRICS AND GYNECOLOGY | Facility: CLINIC | Age: 25
End: 2025-04-22
Payer: COMMERCIAL

## 2025-04-23 ENCOUNTER — HOSPITAL ENCOUNTER (OUTPATIENT)
Dept: RADIOLOGY | Facility: HOSPITAL | Age: 25
Discharge: HOME | End: 2025-04-23
Attending: OBSTETRICS & GYNECOLOGY
Payer: COMMERCIAL

## 2025-04-23 DIAGNOSIS — O26.843 UTERINE SIZE-DATE DISCREPANCY IN THIRD TRIMESTER: ICD-10-CM

## 2025-04-23 DIAGNOSIS — Z34.80 ENCOUNTER FOR SUPERVISION OF OTHER NORMAL PREGNANCY, UNSPECIFIED TRIMESTER: ICD-10-CM

## 2025-04-23 DIAGNOSIS — Z34.80 ENCOUNTER FOR SUPERVISION OF OTHER NORMAL PREGNANCY, UNSPECIFIED TRIMESTER: Primary | ICD-10-CM

## 2025-04-23 PROCEDURE — 76815 OB US LIMITED FETUS(S): CPT | Mod: TC,PN

## 2025-04-25 ENCOUNTER — LAB VISIT (OUTPATIENT)
Dept: LAB | Facility: HOSPITAL | Age: 25
End: 2025-04-25
Attending: OBSTETRICS & GYNECOLOGY
Payer: COMMERCIAL

## 2025-04-25 ENCOUNTER — RESULTS FOLLOW-UP (OUTPATIENT)
Dept: OBSTETRICS AND GYNECOLOGY | Facility: CLINIC | Age: 25
End: 2025-04-25

## 2025-04-25 DIAGNOSIS — O99.019 ANTEPARTUM ANEMIA: ICD-10-CM

## 2025-04-25 LAB
ABSOLUTE EOSINOPHIL (OHS): 0.03 K/UL
ABSOLUTE MONOCYTE (OHS): 0.48 K/UL (ref 0.3–1)
ABSOLUTE NEUTROPHIL COUNT (OHS): 6.15 K/UL (ref 1.8–7.7)
BASOPHILS # BLD AUTO: 0.02 K/UL
BASOPHILS NFR BLD AUTO: 0.2 %
ERYTHROCYTE [DISTWIDTH] IN BLOOD BY AUTOMATED COUNT: 11.9 % (ref 11.5–14.5)
FERRITIN SERPL-MCNC: 7 NG/ML (ref 20–300)
HCT VFR BLD AUTO: 27.6 % (ref 37–48.5)
HGB BLD-MCNC: 8.9 GM/DL (ref 12–16)
IMM GRANULOCYTES # BLD AUTO: 0.02 K/UL (ref 0–0.04)
IMM GRANULOCYTES NFR BLD AUTO: 0.2 % (ref 0–0.5)
IRON SATN MFR SERPL: 7 % (ref 20–50)
IRON SERPL-MCNC: 44 UG/DL (ref 30–160)
LYMPHOCYTES # BLD AUTO: 1.6 K/UL (ref 1–4.8)
MCH RBC QN AUTO: 29.3 PG (ref 27–31)
MCHC RBC AUTO-ENTMCNC: 32.2 G/DL (ref 32–36)
MCV RBC AUTO: 91 FL (ref 82–98)
NUCLEATED RBC (/100WBC) (OHS): 0 /100 WBC
PLATELET # BLD AUTO: 326 K/UL (ref 150–450)
PMV BLD AUTO: 9.7 FL (ref 9.2–12.9)
RBC # BLD AUTO: 3.04 M/UL (ref 4–5.4)
RELATIVE EOSINOPHIL (OHS): 0.4 %
RELATIVE LYMPHOCYTE (OHS): 19.3 % (ref 18–48)
RELATIVE MONOCYTE (OHS): 5.8 % (ref 4–15)
RELATIVE NEUTROPHIL (OHS): 74.1 % (ref 38–73)
TIBC SERPL-MCNC: 591 UG/DL (ref 250–450)
TRANSFERRIN SERPL-MCNC: 399 MG/DL (ref 200–375)
WBC # BLD AUTO: 8.3 K/UL (ref 3.9–12.7)

## 2025-04-25 PROCEDURE — 85025 COMPLETE CBC W/AUTO DIFF WBC: CPT

## 2025-04-25 PROCEDURE — 36415 COLL VENOUS BLD VENIPUNCTURE: CPT | Mod: PO

## 2025-04-25 PROCEDURE — 84466 ASSAY OF TRANSFERRIN: CPT

## 2025-04-25 PROCEDURE — 82728 ASSAY OF FERRITIN: CPT

## 2025-04-28 ENCOUNTER — E-CONSULT (OUTPATIENT)
Dept: HEMATOLOGY/ONCOLOGY | Facility: CLINIC | Age: 25
End: 2025-04-28
Payer: COMMERCIAL

## 2025-04-28 ENCOUNTER — RESULTS FOLLOW-UP (OUTPATIENT)
Dept: OBSTETRICS AND GYNECOLOGY | Facility: CLINIC | Age: 25
End: 2025-04-28

## 2025-04-28 DIAGNOSIS — D50.8 OTHER IRON DEFICIENCY ANEMIA: Primary | ICD-10-CM

## 2025-04-28 DIAGNOSIS — O99.019 ANTEPARTUM ANEMIA: Primary | ICD-10-CM

## 2025-04-28 NOTE — CONSULTS
[facility] - Hematology 5th Fl  Response for E-Consult     Patient Name: Nany Travis  MRN: [id number]  Primary Care Provider: Jesus Sevilla MD   Requesting Provider: Veronica Fletcher MD  Consults    Recommendation: Thank you for the e-consult. This platform is not used for referrals or ordering procedures. For Hematology to order IV iron, patients need referral to hematology for in-person or virtual consult for benefit-risk conversation. Obstetrics department is able to order IV iron also. This patient has [employer]BS, who reports no preference for IV iron formulations.    Additional future steps to consider: none at this time    Total time of Consultation: 5 minute    I did not speak to the requesting provider verbally about this.     *This eConsult is based on the clinical data available to me and is furnished without benefit of a physical examination. The eConsult will need to be interpreted in light of any clinical issues or changes in patient status not available to me at the time of filing this eConsults. Significant changes in patient condition or level of acuity should result in immediate formal consultation and reevaluation. Please alert me if you have further questions.    Thank you for this eConsult referral.     Angi Perrin MD  [facility] - Hematology [facility]

## 2025-04-30 ENCOUNTER — PATIENT MESSAGE (OUTPATIENT)
Dept: OBSTETRICS AND GYNECOLOGY | Facility: CLINIC | Age: 25
End: 2025-04-30
Payer: COMMERCIAL

## 2025-05-05 ENCOUNTER — ROUTINE PRENATAL (OUTPATIENT)
Dept: OBSTETRICS AND GYNECOLOGY | Facility: CLINIC | Age: 25
End: 2025-05-05
Payer: COMMERCIAL

## 2025-05-05 ENCOUNTER — PATIENT MESSAGE (OUTPATIENT)
Dept: OTHER | Facility: OTHER | Age: 25
End: 2025-05-05
Payer: COMMERCIAL

## 2025-05-05 ENCOUNTER — TELEPHONE (OUTPATIENT)
Dept: HEMATOLOGY/ONCOLOGY | Facility: CLINIC | Age: 25
End: 2025-05-05
Payer: COMMERCIAL

## 2025-05-05 ENCOUNTER — PROCEDURE VISIT (OUTPATIENT)
Dept: MATERNAL FETAL MEDICINE | Facility: CLINIC | Age: 25
End: 2025-05-05
Payer: COMMERCIAL

## 2025-05-05 ENCOUNTER — RESULTS FOLLOW-UP (OUTPATIENT)
Dept: OBSTETRICS AND GYNECOLOGY | Facility: CLINIC | Age: 25
End: 2025-05-05

## 2025-05-05 VITALS
WEIGHT: 142.19 LBS | DIASTOLIC BLOOD PRESSURE: 62 MMHG | SYSTOLIC BLOOD PRESSURE: 110 MMHG | BODY MASS INDEX: 26.01 KG/M2

## 2025-05-05 DIAGNOSIS — O99.019 ANTEPARTUM ANEMIA: ICD-10-CM

## 2025-05-05 DIAGNOSIS — O44.00 PLACENTA PREVIA ANTEPARTUM: ICD-10-CM

## 2025-05-05 DIAGNOSIS — Z34.80 ENCOUNTER FOR SUPERVISION OF OTHER NORMAL PREGNANCY, UNSPECIFIED TRIMESTER: Primary | ICD-10-CM

## 2025-05-05 DIAGNOSIS — O44.40 LOW-LYING PLACENTA: ICD-10-CM

## 2025-05-05 LAB
BILIRUBIN, UA POC OHS: NEGATIVE
BLOOD, UA POC OHS: NEGATIVE
CLARITY, UA POC OHS: CLEAR
COLOR, UA POC OHS: YELLOW
GLUCOSE, UA POC OHS: NEGATIVE
KETONES, UA POC OHS: NEGATIVE
LEUKOCYTES, UA POC OHS: NEGATIVE
NITRITE, UA POC OHS: NEGATIVE
PH, UA POC OHS: 8.5
PROTEIN, UA POC OHS: NEGATIVE
SPECIFIC GRAVITY, UA POC OHS: 1.02
UROBILINOGEN, UA POC OHS: 0.2

## 2025-05-05 PROCEDURE — 76817 TRANSVAGINAL US OBSTETRIC: CPT | Mod: S$GLB,,, | Performed by: STUDENT IN AN ORGANIZED HEALTH CARE EDUCATION/TRAINING PROGRAM

## 2025-05-05 PROCEDURE — 0502F SUBSEQUENT PRENATAL CARE: CPT | Mod: CPTII,S$GLB,, | Performed by: OBSTETRICS & GYNECOLOGY

## 2025-05-05 PROCEDURE — 99999 PR PBB SHADOW E&M-EST. PATIENT-LVL III: CPT | Mod: PBBFAC,,, | Performed by: OBSTETRICS & GYNECOLOGY

## 2025-05-05 PROCEDURE — 76816 OB US FOLLOW-UP PER FETUS: CPT | Mod: S$GLB,,, | Performed by: STUDENT IN AN ORGANIZED HEALTH CARE EDUCATION/TRAINING PROGRAM

## 2025-05-05 NOTE — PROGRESS NOTES
Subjective:      Nany Travis is a 24 y.o. female being seen today for her obstetrical visit. She is at 32w0d gestation. Patient reports cramping waking up at night lasting about an hour. Shortness of breath.     OB ROS: no vaginal bleeding, no leakage of fluid, no contractions/cramping, no vaginal discharge. Fetal movement: normal.    Menstrual History:  OB History          2    Para   1    Term   1            AB        Living   1         SAB        IAB        Ectopic        Multiple   0    Live Births   1                Patient's last menstrual period was 2024 (exact date).       Objective:      /62   Wt 64.5 kg (142 lb 3.2 oz)   LMP 2024 (Exact Date)   BMI 26.01 kg/m²     Vitals  BP: 110/62  Weight: 64.5 kg (142 lb 3.2 oz)  Prenatal  Fundal Height (cm): 30 cm  Fetal Heart Rate: 130 US  Movement: Present  Dilation/Effacement/Station  Dilation: 1  Effacement (%): 50  Station: -3  Fundal height size equal to dates  7.8 kg (17 lb 3.1 oz)           Prenatal Labs:  Lab Results   Component Value Date    GROUPTRH A POS 2024    INDCOGEL NEG 2022    HGB 8.9 (L) 2025    HCT 27.6 (L) 2025     2025    RUBELLAIMMUN Reactive 2024    HEPBSAG Non-reactive 2024    [id number] Non-reactive 2024    RPR Non-reactive 2022    LABCHLA Not Detected 2024    LABNGO Not Detected 2024    LABURIN ENTEROCOCCUS FAECALIS  10,000 - 49,999 cfu/ml   (A) 2024    LABURIN (A) 2024     COAGULASE-NEGATIVE STAPHYLOCOCCUS SPECIES  10,000 - 49,999 cfu/ml  Susceptibility testing not routinely performed.      QUADSCREEN Negative 2025    OBGLUCOSESCR 130 2025    STREPBCULT No Group B Streptococcus isolated 08/15/2022    [id number] Negative 2025       Assessment:      Pregnancy 32w0d      Plan:     Final EDC: 2025 by last menstrual period consistent with 8wk ultrasound  BMI 22  Genetic: Quad Neg  Vaccines: declined  flu, Tdap 2025, RVS 2025  Spontaneous vaginal delivery 2022  History of 3rd degree tear.   2023 NILM/ HPV neg  Nausea and vomiting: prescription sent  History of Anemia: ->, MCV 89->91, ferritin 44->7, Referral Heme/Onc  UTIx1: treated 2024  Peds: [facility]  Formula  Contraception: vasectomy  Epidual  25 EFW 1580g, 49%, AC 39%, LIO 15  Previa: Resolved.   Low Lying Placenta: 3.7 mm from internal cervical os.     contractions:   If you have signs of labor then head to L&D.   Signs of labor include:   *Contractions every 5-10 minutes apart for an hour. Contractions getting closer together and stronger. Contractions that don't get better with hydration, rest, warm bath, or Tylenol.   *Any leakage of gush of fluid. If you think your water bag is broken.   *Bleeding like a menstrual cycle. A small amount spotting can be normal, especially if your cervix was just checked.   *Decreased fetal movement: If you are not feeling the baby move, then do kick counts. Drink something cold and sweet. Then, lie on your left side and count the movements. Any movement counts, not just a kick. You should have 10 movements with in 2 hours. If less than that, the report to L&D for evaluation.           1. Encounter for supervision of other normal pregnancy, unspecified trimester  -     POCT Urinalysis(Instrument)         Follow up in 2 weeks.       I reviewed today her blood pressure, weight gain, urine results and  fetal heart rate.  I have reviewed the previous labs and ultrasound with the patient.   I have answered questions to her satisfaction and total of 20 minutes spend today

## 2025-05-19 ENCOUNTER — ROUTINE PRENATAL (OUTPATIENT)
Dept: OBSTETRICS AND GYNECOLOGY | Facility: CLINIC | Age: 25
End: 2025-05-19
Payer: COMMERCIAL

## 2025-05-19 ENCOUNTER — HOSPITAL ENCOUNTER (OUTPATIENT)
Dept: RADIOLOGY | Facility: HOSPITAL | Age: 25
Discharge: HOME | End: 2025-05-19
Attending: OBSTETRICS & GYNECOLOGY
Payer: COMMERCIAL

## 2025-05-19 VITALS — BODY MASS INDEX: 26.61 KG/M2 | DIASTOLIC BLOOD PRESSURE: 62 MMHG | WEIGHT: 145.5 LBS | SYSTOLIC BLOOD PRESSURE: 112 MMHG

## 2025-05-19 DIAGNOSIS — Z34.80 ENCOUNTER FOR SUPERVISION OF OTHER NORMAL PREGNANCY, UNSPECIFIED TRIMESTER: Primary | ICD-10-CM

## 2025-05-19 DIAGNOSIS — O23.40 URINARY TRACT INFECTION IN MOTHER DURING PREGNANCY, ANTEPARTUM: ICD-10-CM

## 2025-05-19 DIAGNOSIS — O26.843 UTERINE SIZE-DATE DISCREPANCY IN THIRD TRIMESTER: ICD-10-CM

## 2025-05-19 LAB
BILIRUBIN, UA POC OHS: NEGATIVE
BLOOD, UA POC OHS: NEGATIVE
CLARITY, UA POC OHS: CLEAR
COLOR, UA POC OHS: YELLOW
GLUCOSE, UA POC OHS: NEGATIVE
KETONES, UA POC OHS: NEGATIVE
LEUKOCYTES, UA POC OHS: NEGATIVE
NITRITE, UA POC OHS: NEGATIVE
PH, UA POC OHS: 8.5
PROTEIN, UA POC OHS: NEGATIVE
SPECIFIC GRAVITY, UA POC OHS: 1.01
UROBILINOGEN, UA POC OHS: 0.2

## 2025-05-19 PROCEDURE — 76816 OB US FOLLOW-UP PER FETUS: CPT | Mod: 26,,, | Performed by: RADIOLOGY

## 2025-05-19 PROCEDURE — 99999 PR PBB SHADOW E&M-EST. PATIENT-LVL III: CPT | Mod: PBBFAC,,,

## 2025-05-19 PROCEDURE — 0502F SUBSEQUENT PRENATAL CARE: CPT | Mod: CPTII,S$GLB,,

## 2025-05-19 PROCEDURE — 76816 OB US FOLLOW-UP PER FETUS: CPT | Mod: TC,PN

## 2025-05-19 PROCEDURE — 87086 URINE CULTURE/COLONY COUNT: CPT

## 2025-05-19 NOTE — PROGRESS NOTES
The patient presents with complaints of one night of contractions since her last visit. Spontaneously resolved. Precautions reviewed .   Reports: Good fetal movements reported. No Bleeding or pains  Growth scan today, EFW 44th%tile     2025  24 y.o. 34w0d Estimated Date of Delivery: 25, dating reviewed.   OB History    Para Term  AB Living   2 1 1   1   SAB IAB Ectopic Multiple Live Births      0 1      # Outcome Date GA Lbr Morris/2nd Weight Sex Type Anes PTL Lv   2 Current            1 Term 22 40w2d / 00:29 3.161 kg (6 lb 15.5 oz) F Vag-Spont EPI N SINGH       Prenatal labs reviewed and updated today    Review of Systems:  General ROS: negative for headache or visual changes  Breast ROS: negative for breast lumps  Gastrointestinal ROS: negative for constipation, diarrhea or nausea/vomiting  Musculoskeletal ROS: negative for pain in joints or swelling in face or hands.   Neurological ROS: negative for - headaches, numbness/tingling or visual changes      Physical Exam:  /62   Wt 66 kg (145 lb 8.1 oz)   LMP 2024 (Exact Date)   BMI 26.61 kg/m²   Urine Dip: Pending  Fundal Height:32 cm  Fetal Heart Tones: 147 bpm  Constitutional: She is oriented to person, place, and time. She appears well-developed and well-nourished. No distress. Normal weight   Cardiovascular: Normal rate.    Pulmonary/Chest: Effort normal. No respiratory distress  Abdominal: Soft, gravid, nontender. No rebound and no guarding.     Genitourinary: Deferred     Musculoskeletal: Normal range of motion, no peripheral edema.   Neurological: She is alert and oriented to person, place, and time. Coordination normal.   Skin: Skin is warm and dry. She is not diaphoretic.  Psychiatric: She has a normal mood and affect.        Assessment:  24 y.o., at 34w0d Gestation   Problem List[1]  Medications Ordered Prior to Encounter[2]      Plan:   Labs today: none  Orders today: none  MEds today: none  Procedures Today:growth  as above  Follow up 2 Weeks, bleeding/pain precautions, kick counts, labor precautions                     [1]   Patient Active Problem List  Diagnosis    Obstetric vaginal laceration with type 3a third degree perineal laceration    Placenta previa antepartum    Antepartum anemia    Low-lying placenta   [2]   Current Outpatient Medications on File Prior to Visit   Medication Sig Dispense Refill    ferrous sulfate 325 (65 FE) MG EC tablet Take 1 tablet (325 mg total) by mouth once daily. 30 tablet 11    ondansetron (ZOFRAN-ODT) 4 MG TbDL Take 1 tablet (4 mg total) by mouth every 6 (six) hours as needed (nausea). 30 tablet 1    promethazine (PHENERGAN) 25 MG tablet Take 1 tablet (25 mg total) by mouth every 6 (six) hours as needed for Nausea. 30 tablet 1    valACYclovir (VALTREX) 500 MG tablet Take 1 tablet (500 mg total) by mouth once daily. 30 tablet 11    azithromycin (Z-MAX) 250 MG tablet Take 1 tablet (250 mg total) by mouth once daily. Take first 2 tablets together, then 1 every day until finished. (Patient not taking: Reported on 1/14/2025) 6 tablet 0    doxylamine succinate (UNISOM, DOXYLAMINE,) 25 mg tablet Take 1/2 tablet three times a day to prevent nausea/vomitting in pregnancy (Patient not taking: Reported on 1/14/2025)      ondansetron (ZOFRAN-ODT) 4 MG TbDL Take 1 tablet (4 mg total) by mouth every 6 (six) hours as needed (nausea). (Patient not taking: Reported on 4/21/2025) 30 tablet 0     No current facility-administered medications on file prior to visit.

## 2025-05-22 ENCOUNTER — RESULTS FOLLOW-UP (OUTPATIENT)
Dept: OBSTETRICS AND GYNECOLOGY | Facility: CLINIC | Age: 25
End: 2025-05-22

## 2025-05-22 ENCOUNTER — PATIENT MESSAGE (OUTPATIENT)
Dept: OBSTETRICS AND GYNECOLOGY | Facility: CLINIC | Age: 25
End: 2025-05-22
Payer: COMMERCIAL

## 2025-05-22 LAB — BACTERIA UR CULT: NORMAL

## 2025-05-23 ENCOUNTER — PATIENT MESSAGE (OUTPATIENT)
Dept: OBSTETRICS AND GYNECOLOGY | Facility: CLINIC | Age: 25
End: 2025-05-23
Payer: COMMERCIAL

## 2025-05-26 ENCOUNTER — TELEPHONE (OUTPATIENT)
Dept: HEMATOLOGY/ONCOLOGY | Facility: CLINIC | Age: 25
End: 2025-05-26
Payer: COMMERCIAL

## 2025-05-26 ENCOUNTER — DOCUMENTATION ONLY (OUTPATIENT)
Dept: HEMATOLOGY/ONCOLOGY | Facility: CLINIC | Age: 25
End: 2025-05-26
Payer: COMMERCIAL

## 2025-05-26 NOTE — TELEPHONE ENCOUNTER
Patient return called to schedule appt.  Patient was schedule with YVROSE Mendoza on 05/29 at 3PM.  Patient verbal understanding of time, date, location of appointment and had no further questions. Also directed patient to my chart for appointment details

## 2025-05-26 NOTE — PROGRESS NOTES
Hematology Consult Note    Nany Travis   female  2000  [id number]    05/28/2025    Referral physician:    Jesus Sevilla MD  [address] [place] [id number]    Reason for consult: Anemia    Subjective:      History of Present Illness:    Nany Travis is a 24 y.o. y/o female with history of anemia with previous pregnancy presents to clinic as a new patient from Dr. Sevilla for iron deficient anemia with her current pregnancy. Patient is currently 35w2d gestation. Patient endorses fatigue, SOB, ice cravings, anxiety and back pain. Patient states she was also iron deficient with her first pregnancy. She was taking oral iron but ran out a few weeks ago.     Current CBC from 4/25/25 reveals WBC 8.30 K/uL, Hgb 8.9 g/dL, Hct 27.6%, MCV 91, ferritin 7 ng/mL with a TIBC of 591. BMP on 3/11/25 shows a creat of 0.6 with eGFR >60    On today's visit, the pt denies fever, chills, headache, sore throat, neck swelling, chest pain, SOB, cough, N/V/D, Abd pain.  Dysuria, hematuria, or weakness in extremities. No back pain, bone pain. Stool or urine incontinence. Denies tingling numbness in fingers/toes.     Allergies:  Review of patient's allergies indicates:  No Known Allergies    Current Medications:  Medications Ordered Prior to Encounter[1]    Past Medical History:  Past Medical History:   Diagnosis Date    Anemia 07/2022    with pregnancy       Past Surgical History:  Past Surgical History:   Procedure Laterality Date    TYMPANOSTOMY TUBE PLACEMENT Bilateral 2001    WISDOM TOOTH EXTRACTION Bilateral 2013       Social History:  Social History[2]    Smoking history:   Pack/     Yr     Current smoking    Y/N?        Cig/day  ETOH:  Drug Abuse?    Family History:  Family History   Problem Relation Name Age of Onset    Cancer Maternal Grandfather      Diabetes Maternal Grandfather      Breast cancer Neg Hx      Colon cancer Neg Hx      Ovarian cancer Neg Hx         Review of Systems:  Review of Systems   Respiratory:   "Positive for shortness of breath. Negative for cough.    Cardiovascular:  Negative for chest pain.   Gastrointestinal:  Negative for abdominal pain and diarrhea.   Genitourinary:  Negative for frequency.   Musculoskeletal:  Positive for back pain.   Skin:  Negative for rash.   Neurological:  Negative for dizziness and headaches.   Psychiatric/Behavioral:  The patient is nervous/anxious.            Objective:       Physical Examination:  Vitals:   Last menstrual period 09/23/2024.    Physical Exam  Vitals reviewed.   Constitutional:       General: She is not in acute distress.  Eyes:      General: No scleral icterus.  Cardiovascular:      Rate and Rhythm: Normal rate and regular rhythm.      Heart sounds: No murmur heard.  Pulmonary:      Effort: Pulmonary effort is normal.   Musculoskeletal:      Right lower leg: No edema.      Left lower leg: No edema.   Skin:     General: Skin is warm and dry.      Findings: No rash.   Neurological:      Mental Status: She is alert and oriented to person, place, and time.   Psychiatric:         Behavior: Behavior normal.         Thought Content: Thought content normal.          Most Recent Data:  CBC:   Lab Results   Component Value Date    WBC 8.30 04/25/2025    HGB 8.9 (L) 04/25/2025    HCT 27.6 (L) 04/25/2025     04/25/2025    MCV 91 04/25/2025    RDW 11.9 04/25/2025     BMP:   Lab Results   Component Value Date     03/11/2025    K 3.7 03/11/2025     03/11/2025    CO2 21 (L) 03/11/2025    BUN 5 (L) 03/11/2025     03/11/2025    CALCIUM 8.5 (L) 03/11/2025     LFTs:   Lab Results   Component Value Date    PROT 7.3 01/01/2025    ALBUMIN 3.5 01/01/2025    BILITOT 0.2 01/01/2025    AST 28 01/01/2025    ALKPHOS 37 (L) 01/01/2025    ALT 8 (L) 01/01/2025     Coags: No results found for: "INR", "PROTIME", "PTT"  FLP: No results found for: "CHOL", "HDL", "LDLCALC", "TRIG", "CHOLHDL"  DM:   Lab Results   Component Value Date    CREATININE 0.6 03/11/2025 "     Thyroid:   Lab Results   Component Value Date    TSH 0.431 03/11/2025     Anemia:   Lab Results   Component Value Date    IRON 44 04/25/2025    TIBC 591 (H) 04/25/2025    FERRITIN 7.0 (L) 04/25/2025       Radiology and other Results:  US OB Follow-up Ea Gestation Transabd  Result Date: 5/19/2025  EXAMINATION: US OB FOLLOW UP EA GESTATION TRANSABDOMINAL CLINICAL HISTORY: Size date discrepency. Growth US.;  Uterine size-date discrepancy, third trimester TECHNIQUE: Transabdominal 2nd or 3rd trimester obstetrical ultrasound was performed. COMPARISON: Ob ultrasound-04/23/2024 FINDINGS: There is a single, live, intrauterine gestation with cephalic presentation, a combined biometric derived estimated gestational age of 33 weeks 5 days, and an FRANK of 07/02/2025.  The estimated fetal weight is 2332 g with a Hadlock percentile of 44%. The individual biometric parameters are as follows: BPD: 8.13cm-32 weeks 5 days (13%) Head circumference: 30cm-33 weeks 2 days (7%) Abdominal circumference: 29.8cm-33 weeks 5 days (46%) Femur length: 6.9cm-35 weeks 2 days (74%) Amniotic fluid volume is within normal limits with an LIO of 162 mm and a maximum vertical pocket of 48 mm.  There is fetal cardiac activity noted with a fetal heart rate of 134 bpm on M-mode imaging. The cervix appears closed and there is no evidence of placenta previa.  The placenta is posteriorly situated.  The cervical length cannot be measured due to positioning of the fetus. Limited, fetal anatomic survey including  4 chamber heart view, stomach, kidneys, and  bladder demonstrate no gross abnormalities.     1. Single live intrauterine gestation with a combined biometric derived estimated gestational age of 33 weeks 5 days and an FRANK of 07/02/2025. 2. Amniotic fluid volume is within normal limits and there is no evidence of placenta previa. Electronically signed by: Mukesh Null MD Date:    05/19/2025 Time:    15:36        Assessment     1. Anemia due to other  cause, not classified            Plan     #Iron Deficiency Anemia - due to pregnancy   -Hgb 8.9 g/dL, ferritin 7 ng/mL  -Will give Venofer 300 mg x 3 doses  -Will have patient return in 3 months with CBC, iron studies and ferritin prior to seeing me    Thank you for the opportunity in participating in the care of this very nice patient. If you have any questions or concerns, please do not hesitate to contact me.     AL Cervantes  Hematology and Medical Oncology  [facility]  [facility]    51 minutes of total time spent on the encounter, which includes face to face time and non-face to face time preparing to see the patient (eg, review of tests), Obtaining and/or reviewing separately obtained history, documenting clinical information in the electronic or other health record, independently interpreting results if documented above (not separately reported) and communicating results to the patient/family/caregiver, or Care coordination (not separately reported).     Route Chart for Scheduling    Med Onc Chart Routing      Follow up with physician    Follow up with AGUSTÍN . Patient will need IV iron x 3 doses Venofer. She will need CBC, iron studies and ferritin a few days prior to seeing me in 3 months.   Infusion scheduling note    Injection scheduling note    Labs    Imaging    Pharmacy appointment    Other referrals                            [1]   Current Outpatient Medications on File Prior to Visit   Medication Sig Dispense Refill    azithromycin (Z-MAX) 250 MG tablet Take 1 tablet (250 mg total) by mouth once daily. Take first 2 tablets together, then 1 every day until finished. (Patient not taking: Reported on 1/14/2025) 6 tablet 0    doxylamine succinate (UNISOM, DOXYLAMINE,) 25 mg tablet Take 1/2 tablet three times a day to prevent nausea/vomitting in pregnancy (Patient not taking: Reported on 1/14/2025)      ferrous sulfate 325 (65 FE) MG EC tablet Take 1 tablet  (325 mg total) by mouth once daily. 30 tablet 11    ondansetron (ZOFRAN-ODT) 4 MG TbDL Take 1 tablet (4 mg total) by mouth every 6 (six) hours as needed (nausea). 30 tablet 1    ondansetron (ZOFRAN-ODT) 4 MG TbDL Take 1 tablet (4 mg total) by mouth every 6 (six) hours as needed (nausea). (Patient not taking: Reported on 4/21/2025) 30 tablet 0    promethazine (PHENERGAN) 25 MG tablet Take 1 tablet (25 mg total) by mouth every 6 (six) hours as needed for Nausea. 30 tablet 1    valACYclovir (VALTREX) 500 MG tablet Take 1 tablet (500 mg total) by mouth once daily. 30 tablet 11     No current facility-administered medications on file prior to visit.   [2]   Social History  Tobacco Use    Smoking status: Never     Passive exposure: Never    Smokeless tobacco: Never   Substance Use Topics    Alcohol use: Not Currently    Drug use: Never

## 2025-05-28 ENCOUNTER — OFFICE VISIT (OUTPATIENT)
Dept: HEMATOLOGY/ONCOLOGY | Facility: CLINIC | Age: 25
End: 2025-05-28
Payer: COMMERCIAL

## 2025-05-28 VITALS
HEIGHT: 62 IN | TEMPERATURE: 98 F | BODY MASS INDEX: 27.14 KG/M2 | HEART RATE: 69 BPM | RESPIRATION RATE: 14 BRPM | DIASTOLIC BLOOD PRESSURE: 69 MMHG | WEIGHT: 147.5 LBS | SYSTOLIC BLOOD PRESSURE: 120 MMHG

## 2025-05-28 DIAGNOSIS — D50.8 OTHER IRON DEFICIENCY ANEMIA: ICD-10-CM

## 2025-05-28 DIAGNOSIS — D64.89 ANEMIA DUE TO OTHER CAUSE, NOT CLASSIFIED: Primary | ICD-10-CM

## 2025-05-28 PROBLEM — D50.9 IRON DEFICIENCY ANEMIA: Status: ACTIVE | Noted: 2025-05-28

## 2025-05-28 PROCEDURE — 99999 PR PBB SHADOW E&M-EST. PATIENT-LVL IV: CPT | Mod: PBBFAC,,, | Performed by: NURSE PRACTITIONER

## 2025-05-28 RX ORDER — HEPARIN 100 UNIT/ML
500 SYRINGE INTRAVENOUS
OUTPATIENT
Start: 2025-05-30

## 2025-05-28 RX ORDER — EPINEPHRINE 0.3 MG/.3ML
0.3 INJECTION SUBCUTANEOUS ONCE AS NEEDED
OUTPATIENT
Start: 2025-05-30

## 2025-05-28 RX ORDER — SODIUM CHLORIDE 0.9 % (FLUSH) 0.9 %
10 SYRINGE (ML) INJECTION
OUTPATIENT
Start: 2025-05-30

## 2025-06-02 ENCOUNTER — TELEPHONE (OUTPATIENT)
Dept: INFUSION THERAPY | Facility: HOSPITAL | Age: 25
End: 2025-06-02
Payer: COMMERCIAL

## 2025-06-02 ENCOUNTER — PATIENT MESSAGE (OUTPATIENT)
Dept: OBSTETRICS AND GYNECOLOGY | Facility: CLINIC | Age: 25
End: 2025-06-02

## 2025-06-02 ENCOUNTER — ROUTINE PRENATAL (OUTPATIENT)
Dept: OBSTETRICS AND GYNECOLOGY | Facility: CLINIC | Age: 25
End: 2025-06-02
Payer: COMMERCIAL

## 2025-06-02 VITALS
SYSTOLIC BLOOD PRESSURE: 110 MMHG | BODY MASS INDEX: 26.81 KG/M2 | DIASTOLIC BLOOD PRESSURE: 62 MMHG | WEIGHT: 146.63 LBS

## 2025-06-02 DIAGNOSIS — Z34.80 ENCOUNTER FOR SUPERVISION OF OTHER NORMAL PREGNANCY, UNSPECIFIED TRIMESTER: Primary | ICD-10-CM

## 2025-06-02 LAB
BILIRUBIN, UA POC OHS: NEGATIVE
BLOOD, UA POC OHS: NEGATIVE
CLARITY, UA POC OHS: CLEAR
COLOR, UA POC OHS: YELLOW
GLUCOSE, UA POC OHS: NEGATIVE
KETONES, UA POC OHS: NEGATIVE
LEUKOCYTES, UA POC OHS: ABNORMAL
NITRITE, UA POC OHS: NEGATIVE
PH, UA POC OHS: 7.5
PROTEIN, UA POC OHS: 30
SPECIFIC GRAVITY, UA POC OHS: 1.02
UROBILINOGEN, UA POC OHS: 0.2

## 2025-06-02 PROCEDURE — 87653 STREP B DNA AMP PROBE: CPT | Performed by: OBSTETRICS & GYNECOLOGY

## 2025-06-02 PROCEDURE — 0502F SUBSEQUENT PRENATAL CARE: CPT | Mod: CPTII,S$GLB,, | Performed by: OBSTETRICS & GYNECOLOGY

## 2025-06-02 PROCEDURE — 99999 PR PBB SHADOW E&M-EST. PATIENT-LVL III: CPT | Mod: PBBFAC,,, | Performed by: OBSTETRICS & GYNECOLOGY

## 2025-06-03 LAB — GROUP B STREPTOCOCCUS, PCR (OHS): NEGATIVE

## 2025-06-04 ENCOUNTER — INFUSION (OUTPATIENT)
Dept: INFUSION THERAPY | Facility: HOSPITAL | Age: 25
End: 2025-06-04
Attending: NURSE PRACTITIONER
Payer: COMMERCIAL

## 2025-06-04 VITALS
BODY MASS INDEX: 27.79 KG/M2 | HEART RATE: 85 BPM | SYSTOLIC BLOOD PRESSURE: 110 MMHG | HEIGHT: 62 IN | TEMPERATURE: 99 F | DIASTOLIC BLOOD PRESSURE: 70 MMHG | OXYGEN SATURATION: 99 % | WEIGHT: 151 LBS | RESPIRATION RATE: 17 BRPM

## 2025-06-04 DIAGNOSIS — D50.8 OTHER IRON DEFICIENCY ANEMIA: Primary | ICD-10-CM

## 2025-06-04 PROCEDURE — 25000003 PHARM REV CODE 250: Mod: PN | Performed by: NURSE PRACTITIONER

## 2025-06-04 PROCEDURE — 63600175 PHARM REV CODE 636 W HCPCS: Mod: PN | Performed by: NURSE PRACTITIONER

## 2025-06-04 PROCEDURE — 96365 THER/PROPH/DIAG IV INF INIT: CPT | Mod: PN

## 2025-06-04 RX ORDER — EPINEPHRINE 0.3 MG/.3ML
0.3 INJECTION SUBCUTANEOUS ONCE AS NEEDED
OUTPATIENT
Start: 2025-06-11

## 2025-06-04 RX ORDER — SODIUM CHLORIDE 0.9 % (FLUSH) 0.9 %
10 SYRINGE (ML) INJECTION
OUTPATIENT
Start: 2025-06-11

## 2025-06-04 RX ORDER — SODIUM CHLORIDE 0.9 % (FLUSH) 0.9 %
10 SYRINGE (ML) INJECTION
Status: DISCONTINUED | OUTPATIENT
Start: 2025-06-04 | End: 2025-06-04 | Stop reason: HOSPADM

## 2025-06-04 RX ORDER — HEPARIN 100 UNIT/ML
500 SYRINGE INTRAVENOUS
Status: DISCONTINUED | OUTPATIENT
Start: 2025-06-04 | End: 2025-06-04 | Stop reason: HOSPADM

## 2025-06-04 RX ORDER — HEPARIN 100 UNIT/ML
500 SYRINGE INTRAVENOUS
OUTPATIENT
Start: 2025-06-11

## 2025-06-04 RX ADMIN — IRON SUCROSE 300 MG: 20 INJECTION, SOLUTION INTRAVENOUS at 01:06

## 2025-06-10 ENCOUNTER — ROUTINE PRENATAL (OUTPATIENT)
Dept: OBSTETRICS AND GYNECOLOGY | Facility: CLINIC | Age: 25
End: 2025-06-10
Payer: COMMERCIAL

## 2025-06-10 VITALS
BODY MASS INDEX: 26.81 KG/M2 | SYSTOLIC BLOOD PRESSURE: 120 MMHG | WEIGHT: 146.63 LBS | DIASTOLIC BLOOD PRESSURE: 64 MMHG

## 2025-06-10 DIAGNOSIS — Z34.80 ENCOUNTER FOR SUPERVISION OF OTHER NORMAL PREGNANCY, UNSPECIFIED TRIMESTER: Primary | ICD-10-CM

## 2025-06-10 LAB
BILIRUBIN, UA POC OHS: ABNORMAL
BLOOD, UA POC OHS: NEGATIVE
CLARITY, UA POC OHS: CLEAR
COLOR, UA POC OHS: YELLOW
GLUCOSE, UA POC OHS: NEGATIVE
KETONES, UA POC OHS: NEGATIVE
LEUKOCYTES, UA POC OHS: NEGATIVE
NITRITE, UA POC OHS: NEGATIVE
PH, UA POC OHS: 7.5
PROTEIN, UA POC OHS: 30
SPECIFIC GRAVITY, UA POC OHS: 1.02
UROBILINOGEN, UA POC OHS: 1

## 2025-06-10 PROCEDURE — 99999 PR PBB SHADOW E&M-EST. PATIENT-LVL III: CPT | Mod: PBBFAC,,, | Performed by: OBSTETRICS & GYNECOLOGY

## 2025-06-10 PROCEDURE — 0502F SUBSEQUENT PRENATAL CARE: CPT | Mod: CPTII,S$GLB,, | Performed by: OBSTETRICS & GYNECOLOGY

## 2025-06-10 NOTE — PROGRESS NOTES
Subjective:      Nany Travis is a 24 y.o. female being seen today for her obstetrical visit. She is at 37w1d gestation. Patient reports no complaints.     OB ROS: no vaginal bleeding, no leakage of fluid, no contractions/cramping, no vaginal discharge. Fetal movement: normal.    Menstrual History:  OB History          2    Para   1    Term   1            AB        Living   1         SAB        IAB        Ectopic        Multiple   0    Live Births   1                Patient's last menstrual period was 2024 (exact date).       Objective:      /64   Wt 66.5 kg (146 lb 9.7 oz)   LMP 2024 (Exact Date)   BMI 26.81 kg/m²     Vitals  BP: 120/64  Weight: 66.5 kg (146 lb 9.7 oz)  Prenatal  Fetal Heart Rate: 137  Movement: Present  Dilation/Effacement/Station  Dilation: 2  Fundal height size equal to dates  9.8 kg (21 lb 9.7 oz)           Prenatal Labs:  Lab Results   Component Value Date    GROUPTRH A POS 2024    INDCOGEL NEG 2022    HGB 8.9 (L) 2025    HCT 27.6 (L) 2025     2025    RUBELLAIMMUN Reactive 2024    HEPBSAG Non-reactive 2024    [id number] Non-reactive 2024    RPR Non-reactive 2022    LABCHLA Not Detected 2024    LABNGO Not Detected 2024    LABURIN No Significant Growth 2025    QUADSCREEN Negative 2025    OBGLUCOSESCR 130 2025    STREPBCULT No Group B Streptococcus isolated 08/15/2022    [id number] Negative 2025       Assessment:      Pregnancy 37w1d      Plan:     Final EDC: 2025 by last menstrual period consistent with 8wk ultrasound  GBS neg  BMI 22  Genetic: Quad Neg  Vaccines: declined flu, Tdap 2025, RVS 2025  Spontaneous vaginal delivery 2022  History of 3rd degree tear.   2023 NILM/ HPV neg  Nausea and vomiting: prescription sent  History of Anemia: ->, MCV 89->91, ferritin 44->7, Started Iron transfusions  UTIx1: treated 2024  Peds:  [facility]  Formula  Contraception: vasectomy  Epidual  4/23/25 EFW 1580g, 49%, AC 39%, LIO 15  Previa: Resolved.   Low Lying: 3.7 mm from internal cervical os.  5/5/23 3.1mm from os  5/19/2025 Growth 33.5wk, EFW 2332g, 44%, AC 46%, LIO 16,         1. Encounter for supervision of other normal pregnancy, unspecified trimester  -     POCT Urinalysis(Instrument)         Follow up in 4 weeks.       I reviewed today her blood pressure, weight gain, urine results and  fetal heart rate.  I have reviewed the previous labs and ultrasound with the patient.   I have answered questions to her satisfaction and total of 20 minutes spend today

## 2025-06-11 ENCOUNTER — INFUSION (OUTPATIENT)
Dept: INFUSION THERAPY | Facility: HOSPITAL | Age: 25
End: 2025-06-11
Attending: NURSE PRACTITIONER
Payer: COMMERCIAL

## 2025-06-11 ENCOUNTER — TELEPHONE (OUTPATIENT)
Dept: INFUSION THERAPY | Facility: HOSPITAL | Age: 25
End: 2025-06-11
Payer: COMMERCIAL

## 2025-06-11 VITALS
DIASTOLIC BLOOD PRESSURE: 62 MMHG | BODY MASS INDEX: 26.98 KG/M2 | RESPIRATION RATE: 16 BRPM | SYSTOLIC BLOOD PRESSURE: 103 MMHG | WEIGHT: 146.63 LBS | TEMPERATURE: 98 F | HEART RATE: 82 BPM | OXYGEN SATURATION: 97 % | HEIGHT: 62 IN

## 2025-06-11 DIAGNOSIS — D50.8 OTHER IRON DEFICIENCY ANEMIA: Primary | ICD-10-CM

## 2025-06-11 PROCEDURE — A4216 STERILE WATER/SALINE, 10 ML: HCPCS | Mod: PN | Performed by: NURSE PRACTITIONER

## 2025-06-11 PROCEDURE — 63600175 PHARM REV CODE 636 W HCPCS: Mod: PN | Performed by: NURSE PRACTITIONER

## 2025-06-11 PROCEDURE — 96365 THER/PROPH/DIAG IV INF INIT: CPT | Mod: PN

## 2025-06-11 PROCEDURE — 25000003 PHARM REV CODE 250: Mod: PN | Performed by: NURSE PRACTITIONER

## 2025-06-11 RX ORDER — SODIUM CHLORIDE 0.9 % (FLUSH) 0.9 %
10 SYRINGE (ML) INJECTION
OUTPATIENT
Start: 2025-06-18

## 2025-06-11 RX ORDER — HEPARIN 100 UNIT/ML
500 SYRINGE INTRAVENOUS
OUTPATIENT
Start: 2025-06-18

## 2025-06-11 RX ORDER — SODIUM CHLORIDE 0.9 % (FLUSH) 0.9 %
10 SYRINGE (ML) INJECTION
Status: DISCONTINUED | OUTPATIENT
Start: 2025-06-11 | End: 2025-06-11 | Stop reason: HOSPADM

## 2025-06-11 RX ORDER — HEPARIN 100 UNIT/ML
500 SYRINGE INTRAVENOUS
Status: DISCONTINUED | OUTPATIENT
Start: 2025-06-11 | End: 2025-06-11 | Stop reason: HOSPADM

## 2025-06-11 RX ORDER — EPINEPHRINE 0.3 MG/.3ML
0.3 INJECTION SUBCUTANEOUS ONCE AS NEEDED
Status: DISCONTINUED | OUTPATIENT
Start: 2025-06-11 | End: 2025-06-11 | Stop reason: HOSPADM

## 2025-06-11 RX ORDER — EPINEPHRINE 0.3 MG/.3ML
0.3 INJECTION SUBCUTANEOUS ONCE AS NEEDED
OUTPATIENT
Start: 2025-06-18

## 2025-06-11 RX ADMIN — IRON SUCROSE 300 MG: 20 INJECTION, SOLUTION INTRAVENOUS at 02:06

## 2025-06-11 RX ADMIN — Medication 10 ML: at 04:06

## 2025-06-11 RX ADMIN — SODIUM CHLORIDE: 9 INJECTION, SOLUTION INTRAVENOUS at 02:06

## 2025-06-11 NOTE — PLAN OF CARE
Problem: Adult Inpatient Plan of Care  Goal: Plan of Care Review  Outcome: Progressing  Flowsheets (Taken 6/11/2025 1610)  Plan of Care Reviewed With: patient  Goal: Patient-Specific Goal (Individualized)  Outcome: Progressing  Flowsheets (Taken 6/11/2025 1610)  Individualized Care Needs: conversation, recliner  Anxieties, Fears or Concerns: none  Patient/Family-Specific Goals (Include Timeframe): no s/s of reaction with tx     Problem: Fatigue  Goal: Improved Activity Tolerance  Outcome: Progressing  Intervention: Promote Improved Energy  Flowsheets (Taken 6/11/2025 1610)  Fatigue Management: paced activity encouraged  Sleep/Rest Enhancement: natural light exposure provided  Activity Management:   Ambulated -L4   Up in chair - L3  Environmental Support:   environmental consistency promoted   distractions minimized  Pt. tolerated IV Venofer well. VS stable, no adverse reactions noted. 30 min post observation refused. Discussed future appointments, NAD noted. Pt. discharged to home ambulatory with no assistance needed.     Detail Level: Zone Detail Level: Generalized Detail Level: Simple

## 2025-06-16 ENCOUNTER — TELEPHONE (OUTPATIENT)
Dept: INFUSION THERAPY | Facility: HOSPITAL | Age: 25
End: 2025-06-16
Payer: COMMERCIAL

## 2025-06-16 ENCOUNTER — ROUTINE PRENATAL (OUTPATIENT)
Dept: OBSTETRICS AND GYNECOLOGY | Facility: CLINIC | Age: 25
End: 2025-06-16
Payer: COMMERCIAL

## 2025-06-16 VITALS — DIASTOLIC BLOOD PRESSURE: 74 MMHG | SYSTOLIC BLOOD PRESSURE: 116 MMHG | WEIGHT: 147.5 LBS | BODY MASS INDEX: 26.98 KG/M2

## 2025-06-16 DIAGNOSIS — Z34.80 ENCOUNTER FOR SUPERVISION OF OTHER NORMAL PREGNANCY, UNSPECIFIED TRIMESTER: Primary | ICD-10-CM

## 2025-06-16 LAB
BILIRUBIN, UA POC OHS: NEGATIVE
BLOOD, UA POC OHS: NEGATIVE
CLARITY, UA POC OHS: CLEAR
COLOR, UA POC OHS: YELLOW
GLUCOSE, UA POC OHS: NEGATIVE
KETONES, UA POC OHS: NEGATIVE
LEUKOCYTES, UA POC OHS: ABNORMAL
NITRITE, UA POC OHS: NEGATIVE
PH, UA POC OHS: 7
PROTEIN, UA POC OHS: NEGATIVE
SPECIFIC GRAVITY, UA POC OHS: 1.02
UROBILINOGEN, UA POC OHS: 0.2

## 2025-06-16 PROCEDURE — 0502F SUBSEQUENT PRENATAL CARE: CPT | Mod: CPTII,S$GLB,, | Performed by: OBSTETRICS & GYNECOLOGY

## 2025-06-16 PROCEDURE — 99999 PR PBB SHADOW E&M-EST. PATIENT-LVL II: CPT | Mod: PBBFAC,,, | Performed by: OBSTETRICS & GYNECOLOGY

## 2025-06-16 NOTE — PROGRESS NOTES
Subjective:      Nany Travis is a 24 y.o. female being seen today for her obstetrical visit. She is at 38w0d gestation. Patient reports no complaints.     OB ROS: no vaginal bleeding, no leakage of fluid, no contractions/cramping, no vaginal discharge. Fetal movement: normal.    Menstrual History:  OB History          2    Para   1    Term   1            AB        Living   1         SAB        IAB        Ectopic        Multiple   0    Live Births   1                Patient's last menstrual period was 2024 (exact date).       Objective:      /74   Wt 66.9 kg (147 lb 7.8 oz)   LMP 2024 (Exact Date)   BMI 26.98 kg/m²     Vitals  BP: 116/74  Weight: 66.9 kg (147 lb 7.8 oz)  Prenatal  Fetal Heart Rate: 141  Movement: Present  Dilation/Effacement/Station  Dilation: 1.5  Effacement (%): 50  Station: -3  Fundal height size equal to dates  10.2 kg (22 lb 7.8 oz)           Prenatal Labs:  Lab Results   Component Value Date    GROUPTRH A POS 2024    INDCOGEL NEG 2022    HGB 8.9 (L) 2025    HCT 27.6 (L) 2025     2025    RUBELLAIMMUN Reactive 2024    HEPBSAG Non-reactive 2024    [id number] Non-reactive 2024    RPR Non-reactive 2022    LABCHLA Not Detected 2024    LABNGO Not Detected 2024    LABURIN No Significant Growth 2025    QUADSCREEN Negative 2025    OBGLUCOSESCR 130 2025    STREPBCULT No Group B Streptococcus isolated 08/15/2022    [id number] Negative 2025       Assessment:      Pregnancy 38w0d      Plan:     Final EDC: 2025 by last menstrual period consistent with 8wk ultrasound  GBS neg  BMI 22  Genetic: Quad Neg  Vaccines: declined flu, Tdap 2025, RVS 2025  Spontaneous vaginal delivery 2022  History of 3rd degree tear.   2023 NILM/ HPV neg  Nausea and vomiting: prescription sent  History of Anemia: ->, MCV 89->91, ferritin 44->7, , Started Iron  transfusions  UTIx1: treated 11/22/2024  Peds: [facility]  Formula  Contraception: vasectomy  Epidual  4/23/25 EFW 1580g, 49%, AC 39%, LIO 15  Previa: Resolved.   Low Lying: 3.7 mm from internal cervical os.  5/5/23 3.1mm from os  5/19/2025 Growth 33.5wk, EFW 2332g, 44%, AC 46%, LIO 16,         1. Encounter for supervision of other normal pregnancy, unspecified trimester  -     POCT Urinalysis(Instrument)         Follow up in 1 weeks.       I reviewed today her blood pressure, weight gain, urine results and  fetal heart rate.  I have reviewed the previous labs and ultrasound with the patient.   I have answered questions to her satisfaction and total of 20 minutes spend today

## 2025-06-18 ENCOUNTER — PATIENT MESSAGE (OUTPATIENT)
Dept: RESEARCH | Facility: HOSPITAL | Age: 25
End: 2025-06-18
Payer: COMMERCIAL

## 2025-06-19 ENCOUNTER — INFUSION (OUTPATIENT)
Dept: INFUSION THERAPY | Facility: HOSPITAL | Age: 25
End: 2025-06-19
Attending: NURSE PRACTITIONER
Payer: COMMERCIAL

## 2025-06-19 VITALS
SYSTOLIC BLOOD PRESSURE: 106 MMHG | RESPIRATION RATE: 16 BRPM | HEIGHT: 62 IN | BODY MASS INDEX: 27.14 KG/M2 | TEMPERATURE: 98 F | OXYGEN SATURATION: 98 % | WEIGHT: 147.5 LBS | DIASTOLIC BLOOD PRESSURE: 70 MMHG | HEART RATE: 120 BPM

## 2025-06-19 DIAGNOSIS — D50.8 OTHER IRON DEFICIENCY ANEMIA: Primary | ICD-10-CM

## 2025-06-19 RX ORDER — HEPARIN 100 UNIT/ML
500 SYRINGE INTRAVENOUS
OUTPATIENT
Start: 2025-06-19

## 2025-06-19 RX ORDER — EPINEPHRINE 0.3 MG/.3ML
0.3 INJECTION SUBCUTANEOUS ONCE AS NEEDED
Status: DISCONTINUED | OUTPATIENT
Start: 2025-06-19 | End: 2025-06-19 | Stop reason: HOSPADM

## 2025-06-19 RX ORDER — SODIUM CHLORIDE 0.9 % (FLUSH) 0.9 %
10 SYRINGE (ML) INJECTION
OUTPATIENT
Start: 2025-06-19

## 2025-06-19 RX ORDER — SODIUM CHLORIDE 0.9 % (FLUSH) 0.9 %
10 SYRINGE (ML) INJECTION
Status: DISCONTINUED | OUTPATIENT
Start: 2025-06-19 | End: 2025-06-19 | Stop reason: HOSPADM

## 2025-06-19 RX ORDER — EPINEPHRINE 0.3 MG/.3ML
0.3 INJECTION SUBCUTANEOUS ONCE AS NEEDED
OUTPATIENT
Start: 2025-06-19

## 2025-06-19 NOTE — PLAN OF CARE
Problem: Adult Inpatient Plan of Care  Goal: Plan of Care Review  Outcome: Progressing  Flowsheets (Taken 6/19/2025 1540)  Plan of Care Reviewed With:   patient   significant other  Goal: Patient-Specific Goal (Individualized)  Outcome: Progressing  Flowsheets (Taken 6/19/2025 1540)  Individualized Care Needs: recliner/warm blanket/conversation  Anxieties, Fears or Concerns: last infusion     Problem: Fatigue  Goal: Improved Activity Tolerance  Outcome: Progressing  Intervention: Promote Improved Energy  Flowsheets (Taken 6/19/2025 1540)  Fatigue Management:   activity schedule adjusted   fatigue-related activity identified   frequent rest breaks encouraged   paced activity encouraged  Sleep/Rest Enhancement:   noise level reduced   reading promoted   regular sleep/rest pattern promoted   family presence promoted   room darkened  Activity Management:   Ambulated -L4   Ambulated to bathroom - L4   Ambulated in malin - L4   Up in stretcher chair - L1  Environmental Support: calm environment promoted   Pt tolerated Venofer well today. Monitored for 30 min post infusion. NAD/no concerns voiced. Reviewed follow-up appointments. All questions were answered, ambulated independently at d/c with significant other at side.

## 2025-06-23 ENCOUNTER — ROUTINE PRENATAL (OUTPATIENT)
Dept: OBSTETRICS AND GYNECOLOGY | Facility: CLINIC | Age: 25
End: 2025-06-23
Payer: COMMERCIAL

## 2025-06-23 VITALS
BODY MASS INDEX: 27.06 KG/M2 | DIASTOLIC BLOOD PRESSURE: 60 MMHG | WEIGHT: 147.94 LBS | SYSTOLIC BLOOD PRESSURE: 112 MMHG

## 2025-06-23 DIAGNOSIS — Z34.80 ENCOUNTER FOR SUPERVISION OF OTHER NORMAL PREGNANCY, UNSPECIFIED TRIMESTER: Primary | ICD-10-CM

## 2025-06-23 LAB
BILIRUBIN, UA POC OHS: ABNORMAL
BLOOD, UA POC OHS: NEGATIVE
CLARITY, UA POC OHS: CLEAR
COLOR, UA POC OHS: YELLOW
GLUCOSE, UA POC OHS: NEGATIVE
KETONES, UA POC OHS: NEGATIVE
LEUKOCYTES, UA POC OHS: NEGATIVE
NITRITE, UA POC OHS: NEGATIVE
PH, UA POC OHS: 8.5
PROTEIN, UA POC OHS: ABNORMAL
SPECIFIC GRAVITY, UA POC OHS: 1.01
UROBILINOGEN, UA POC OHS: 0.2

## 2025-06-23 PROCEDURE — 99999 PR PBB SHADOW E&M-EST. PATIENT-LVL III: CPT | Mod: PBBFAC,,, | Performed by: OBSTETRICS & GYNECOLOGY

## 2025-06-23 PROCEDURE — 0502F SUBSEQUENT PRENATAL CARE: CPT | Mod: CPTII,S$GLB,, | Performed by: OBSTETRICS & GYNECOLOGY

## 2025-06-23 NOTE — PROGRESS NOTES
Subjective:      Nany Travis is a 24 y.o. female being seen today for her obstetrical visit. She is at 39w0d gestation. Patient reports no complaints.     OB ROS: no vaginal bleeding, no leakage of fluid, no contractions/cramping, no vaginal discharge. Fetal movement: normal.    Menstrual History:  OB History          2    Para   1    Term   1            AB        Living   1         SAB        IAB        Ectopic        Multiple   0    Live Births   1                Patient's last menstrual period was 2024 (exact date).       Objective:      /60   Wt 67.1 kg (147 lb 14.9 oz)   LMP 2024 (Exact Date)   BMI 27.06 kg/m²     Vitals  BP: 112/60  Weight: 67.1 kg (147 lb 14.9 oz)  Prenatal  Fetal Heart Rate: 128  Movement: Present  Fundal height size equal to dates  10.4 kg (22 lb 14.8 oz)           Prenatal Labs:  Lab Results   Component Value Date    GROUPTRH A POS 2024    INDCOGEL NEG 2022    HGB 8.9 (L) 2025    HCT 27.6 (L) 2025     2025    RUBELLAIMMUN Reactive 2024    HEPBSAG Non-reactive 2024    [id number] Non-reactive 2024    RPR Non-reactive 2022    LABCHLA Not Detected 2024    LABNGO Not Detected 2024    LABURIN No Significant Growth 2025    QUADSCREEN Negative 2025    OBGLUCOSESCR 130 2025    STREPBCULT No Group B Streptococcus isolated 08/15/2022    [id number] Negative 2025       Assessment:      Pregnancy 39w0d      Plan:     Final EDC: 2025 by last menstrual period consistent with 8wk ultrasound  FAST, GBS neg  BMI 22  Genetic: Quad Neg  Vaccines: declined flu, Tdap 2025, RVS 2025  Spontaneous vaginal delivery 2022  History of 3rd degree tear.   2023 NIL  M/ HPV neg  Nausea and vomiting: prescription sent  History of Anemia: ->, MCV 89->91, ferritin 44->7, , Started Iron transfusions  UTIx1: treated 2024  Peds:  [facility]  Formula  Contraception: vasectomy  Epidual  4/23/25 EFW 1580g, 49%, AC   39%, LIO 15  Previa: Resolved.   Low Lying: 3.7 mm from internal cervical os.  5/5/23 3.1mm from os  5/19/2025 Growth 33.5wk, EFW 2332g, 44%, AC 46%, LIO 16,         1. Encounter for supervision of other normal pregnancy, unspecified trimester  -     POCT Urinalysis(Instrument)         Follow up in 1 weeks.       I reviewed today her blood pressure, weight gain, urine results and  fetal heart rate.  I have reviewed the previous labs and ultrasound with the patient.   I have answered questions to her satisfaction and total of 20 minutes spend today

## 2025-06-27 ENCOUNTER — PATIENT MESSAGE (OUTPATIENT)
Dept: INFUSION THERAPY | Facility: HOSPITAL | Age: 25
End: 2025-06-27
Payer: COMMERCIAL

## 2025-06-30 ENCOUNTER — TELEPHONE (OUTPATIENT)
Dept: OBSTETRICS AND GYNECOLOGY | Facility: CLINIC | Age: 25
End: 2025-06-30
Payer: COMMERCIAL

## 2025-06-30 NOTE — TELEPHONE ENCOUNTER
Copied from CRM #[id number]. Topic: Appointments - Appointment Access  >> Jun 27, 2025  8:17 AM Agnes wrote:  Type:  Sooner Apoointment Request    Caller is requesting a sooner appointment.  Caller declined first available appointment listed below.  Caller will not accept being placed on the waitlist and is requesting a message be sent to doctor.  Name of Caller: pt  When is the first available appointment? N/A  Symptoms: PP  Would the patient rather a call back or a response via [facility]? Call  Best Call Back Number: [phone]  Additional Information: Please call back to advise. Thanks!      Returned pt call to get pt scheduled for PP visit. Pt understood on time , date and provider.

## 2025-08-01 ENCOUNTER — POSTPARTUM VISIT (OUTPATIENT)
Dept: OBSTETRICS AND GYNECOLOGY | Facility: CLINIC | Age: 25
End: 2025-08-01
Payer: COMMERCIAL

## 2025-08-01 VITALS
SYSTOLIC BLOOD PRESSURE: 110 MMHG | DIASTOLIC BLOOD PRESSURE: 66 MMHG | BODY MASS INDEX: 24.35 KG/M2 | WEIGHT: 133.19 LBS

## 2025-08-01 PROCEDURE — 99999 PR PBB SHADOW E&M-EST. PATIENT-LVL III: CPT | Mod: PBBFAC,,, | Performed by: OBSTETRICS & GYNECOLOGY

## 2025-08-01 NOTE — PROGRESS NOTES
Chief Complaint   Patient presents with    Postpartum Follow-up     24 y.o. here for 6 week postpartum exam without complaint.   She is s/p spontaneous vaginal delivery 2025  Spontaneous delivery of placenta and IV pitocin given noting uterine atony with bleeding improved with uterine massage and methergine x1 dose.  2nd degree laceration noted and repaired in normal fashion with 2-0 vicryl on CT.  Information for the patient's :  Laney Parish [[id number]]     Apgars    Living status: Living  Apgar Component Scores:  1 min.:  5 min.:  10 min.:  15 min.:  20 min.:    Skin color:  1  1       Heart rate:  2  2       Reflex irritability:  2  2       Muscle tone:  2  2       Respiratory effort:  2  2       Total:  9  9       Apgars assigned by: RAISA HARRISON       She has no mood complaints.   Minimal lochia.   No pain.  Breast feeding baby  Contraception: declined  Last pap: 2023 NILM/ HPV neg  Hemoglobin   Date Value Ref Range Status   2025 10.6 (L) 12.0 - 16.0 g/dL Final   2025 10.2 (L) 12.0 - 16.0 g/dL Final     Hematocrit   Date Value Ref Range Status   2025 33.1 (L) 37.0 - 48.5 % Final   2025 32.0 (L) 37.0 - 48.5 % Final     PE:  /66 (Patient Position: Sitting)   Wt 60.4 kg (133 lb 2.5 oz)   LMP 2024 (Exact Date)   Breastfeeding No   BMI 24.35 kg/m²   Body mass index is 24.35 kg/m².  Constitutional: She appears well-developed and well-nourished. No distress.   Abdominal: Soft. She exhibits no distension and no mass. There is no tenderness. There is no rebound and no guarding.   Genitourinary: Physical Exam    Genitourinary:    Vulva, urethra, bladder, vagina, uterus, right adnexa, left adnexa and urethral meatus normal.   The external female genitalia was normal.     Labial bartholins normal.There is no tenderness, lesion or injury on the right labia. There is no tenderness, lesion or injury on the left labia. Sutures intact.     Vaginal exam comments: Sutures  removed with q-tip, small area 4mm healing with mild bleeding.   .   Cervix is normal.       Psychiatric: She has a normal mood and affect.    Assessment:  6 week interval PP exam, doing well    Plan:  Follow up annual  Contraception: declined  Next pap 2026

## 2025-08-05 ENCOUNTER — PATIENT MESSAGE (OUTPATIENT)
Dept: OBSTETRICS AND GYNECOLOGY | Facility: CLINIC | Age: 25
End: 2025-08-05
Payer: COMMERCIAL